# Patient Record
Sex: MALE | Race: WHITE | Employment: OTHER | URBAN - METROPOLITAN AREA
[De-identification: names, ages, dates, MRNs, and addresses within clinical notes are randomized per-mention and may not be internally consistent; named-entity substitution may affect disease eponyms.]

---

## 2017-01-09 RX ORDER — TRAMADOL HYDROCHLORIDE 50 MG/1
TABLET ORAL
Qty: 60 TABLET | Refills: 0 | OUTPATIENT
Start: 2017-01-09 | End: 2017-12-24 | Stop reason: SDUPTHER

## 2017-01-24 ENCOUNTER — NURSE ONLY (OUTPATIENT)
Dept: ORTHOPEDIC SURGERY | Age: 82
End: 2017-01-24

## 2017-01-24 DIAGNOSIS — M16.11 PRIMARY OSTEOARTHRITIS OF RIGHT HIP: ICD-10-CM

## 2017-01-24 DIAGNOSIS — M70.61 TROCHANTERIC BURSITIS, RIGHT HIP: ICD-10-CM

## 2017-01-24 DIAGNOSIS — M17.11 PRIMARY OSTEOARTHRITIS OF RIGHT KNEE: Primary | ICD-10-CM

## 2017-01-24 PROCEDURE — 20611 DRAIN/INJ JOINT/BURSA W/US: CPT | Performed by: PHYSICIAN ASSISTANT

## 2017-01-30 ENCOUNTER — TELEPHONE (OUTPATIENT)
Dept: ORTHOPEDIC SURGERY | Age: 82
End: 2017-01-30

## 2017-02-07 ENCOUNTER — HOSPITAL ENCOUNTER (OUTPATIENT)
Dept: SURGERY | Age: 82
Discharge: HOME | End: 2017-02-07
Attending: ORTHOPAEDIC SURGERY | Admitting: ORTHOPAEDIC SURGERY

## 2017-02-07 VITALS
RESPIRATION RATE: 14 BRPM | SYSTOLIC BLOOD PRESSURE: 165 MMHG | DIASTOLIC BLOOD PRESSURE: 66 MMHG | TEMPERATURE: 98.1 F | OXYGEN SATURATION: 96 % | HEART RATE: 60 BPM

## 2017-02-07 DIAGNOSIS — R52 PAIN: ICD-10-CM

## 2017-02-07 RX ORDER — SODIUM CHLORIDE 0.9 % (FLUSH) 0.9 %
10 SYRINGE (ML) INJECTION EVERY 12 HOURS SCHEDULED
Status: DISCONTINUED | OUTPATIENT
Start: 2017-02-07 | End: 2017-02-08 | Stop reason: HOSPADM

## 2017-02-07 RX ORDER — HYDROCODONE BITARTRATE AND ACETAMINOPHEN 5; 325 MG/1; MG/1
1 TABLET ORAL EVERY 6 HOURS PRN
Qty: 40 TABLET | Refills: 0 | Status: SHIPPED | OUTPATIENT
Start: 2017-02-07 | End: 2017-08-10

## 2017-02-07 RX ORDER — LIDOCAINE HYDROCHLORIDE 10 MG/ML
1 INJECTION, SOLUTION EPIDURAL; INFILTRATION; INTRACAUDAL; PERINEURAL
Status: ACTIVE | OUTPATIENT
Start: 2017-02-07 | End: 2017-02-07

## 2017-02-07 RX ORDER — SODIUM CHLORIDE, SODIUM LACTATE, POTASSIUM CHLORIDE, CALCIUM CHLORIDE 600; 310; 30; 20 MG/100ML; MG/100ML; MG/100ML; MG/100ML
INJECTION, SOLUTION INTRAVENOUS CONTINUOUS
Status: DISCONTINUED | OUTPATIENT
Start: 2017-02-07 | End: 2017-02-08 | Stop reason: HOSPADM

## 2017-02-07 RX ORDER — SODIUM CHLORIDE 0.9 % (FLUSH) 0.9 %
10 SYRINGE (ML) INJECTION PRN
Status: DISCONTINUED | OUTPATIENT
Start: 2017-02-07 | End: 2017-02-08 | Stop reason: HOSPADM

## 2017-02-07 RX ADMIN — SODIUM CHLORIDE, SODIUM LACTATE, POTASSIUM CHLORIDE, CALCIUM CHLORIDE: 600; 310; 30; 20 INJECTION, SOLUTION INTRAVENOUS at 10:42

## 2017-02-07 ASSESSMENT — PAIN SCALES - GENERAL
PAINLEVEL_OUTOF10: 0

## 2017-02-07 ASSESSMENT — PAIN DESCRIPTION - ORIENTATION: ORIENTATION: RIGHT

## 2017-02-07 ASSESSMENT — PAIN DESCRIPTION - LOCATION: LOCATION: HIP

## 2017-04-05 ENCOUNTER — OFFICE VISIT (OUTPATIENT)
Dept: ORTHOPEDIC SURGERY | Age: 82
End: 2017-04-05

## 2017-04-05 VITALS — WEIGHT: 149.91 LBS | HEIGHT: 64 IN | BODY MASS INDEX: 25.59 KG/M2

## 2017-04-05 DIAGNOSIS — M19.011 PRIMARY OSTEOARTHRITIS OF RIGHT SHOULDER: ICD-10-CM

## 2017-04-05 DIAGNOSIS — M25.511 PAIN OF RIGHT SHOULDER REGION: Primary | ICD-10-CM

## 2017-04-05 DIAGNOSIS — M25.512 PAIN OF LEFT SHOULDER REGION: ICD-10-CM

## 2017-04-05 DIAGNOSIS — M75.82 ROTATOR CUFF TENDINITIS, LEFT: ICD-10-CM

## 2017-04-05 PROCEDURE — 20611 DRAIN/INJ JOINT/BURSA W/US: CPT | Performed by: ORTHOPAEDIC SURGERY

## 2017-04-05 PROCEDURE — 73030 X-RAY EXAM OF SHOULDER: CPT | Performed by: ORTHOPAEDIC SURGERY

## 2017-04-05 PROCEDURE — 4040F PNEUMOC VAC/ADMIN/RCVD: CPT | Performed by: ORTHOPAEDIC SURGERY

## 2017-04-05 PROCEDURE — G8427 DOCREV CUR MEDS BY ELIG CLIN: HCPCS | Performed by: ORTHOPAEDIC SURGERY

## 2017-04-05 PROCEDURE — 1123F ACP DISCUSS/DSCN MKR DOCD: CPT | Performed by: ORTHOPAEDIC SURGERY

## 2017-04-05 PROCEDURE — 99213 OFFICE O/P EST LOW 20 MIN: CPT | Performed by: ORTHOPAEDIC SURGERY

## 2017-04-05 PROCEDURE — G8420 CALC BMI NORM PARAMETERS: HCPCS | Performed by: ORTHOPAEDIC SURGERY

## 2017-04-05 PROCEDURE — G8598 ASA/ANTIPLAT THER USED: HCPCS | Performed by: ORTHOPAEDIC SURGERY

## 2017-04-05 PROCEDURE — 1036F TOBACCO NON-USER: CPT | Performed by: ORTHOPAEDIC SURGERY

## 2017-08-04 ENCOUNTER — OFFICE VISIT (OUTPATIENT)
Dept: ORTHOPEDIC SURGERY | Age: 82
End: 2017-08-04

## 2017-08-04 DIAGNOSIS — M16.11 PRIMARY OSTEOARTHRITIS OF RIGHT HIP: ICD-10-CM

## 2017-08-04 DIAGNOSIS — M19.011 PRIMARY OSTEOARTHRITIS OF BOTH SHOULDERS: ICD-10-CM

## 2017-08-04 DIAGNOSIS — M19.012 PRIMARY OSTEOARTHRITIS OF BOTH SHOULDERS: ICD-10-CM

## 2017-08-04 DIAGNOSIS — M17.11 PRIMARY OSTEOARTHRITIS OF RIGHT KNEE: Primary | ICD-10-CM

## 2017-08-04 PROCEDURE — 20610 DRAIN/INJ JOINT/BURSA W/O US: CPT | Performed by: PHYSICIAN ASSISTANT

## 2017-08-04 PROCEDURE — 1036F TOBACCO NON-USER: CPT | Performed by: PHYSICIAN ASSISTANT

## 2017-08-04 PROCEDURE — 99999 PR OFFICE/OUTPT VISIT,PROCEDURE ONLY: CPT | Performed by: PHYSICIAN ASSISTANT

## 2017-08-15 ENCOUNTER — HOSPITAL ENCOUNTER (OUTPATIENT)
Dept: SURGERY | Age: 82
Discharge: HOME | End: 2017-08-15
Attending: ORTHOPAEDIC SURGERY | Admitting: ORTHOPAEDIC SURGERY

## 2017-08-15 VITALS
SYSTOLIC BLOOD PRESSURE: 125 MMHG | RESPIRATION RATE: 18 BRPM | HEIGHT: 64 IN | BODY MASS INDEX: 23.56 KG/M2 | TEMPERATURE: 97 F | WEIGHT: 138 LBS | HEART RATE: 60 BPM | DIASTOLIC BLOOD PRESSURE: 58 MMHG | OXYGEN SATURATION: 98 %

## 2017-08-15 DIAGNOSIS — R52 PAIN: ICD-10-CM

## 2017-08-15 RX ORDER — PROMETHAZINE HYDROCHLORIDE 25 MG/ML
6.25 INJECTION, SOLUTION INTRAMUSCULAR; INTRAVENOUS
Status: ACTIVE | OUTPATIENT
Start: 2017-08-15 | End: 2017-08-15

## 2017-08-15 RX ORDER — OXYCODONE HYDROCHLORIDE AND ACETAMINOPHEN 5; 325 MG/1; MG/1
2 TABLET ORAL PRN
Status: ACTIVE | OUTPATIENT
Start: 2017-08-15 | End: 2017-08-15

## 2017-08-15 RX ORDER — MORPHINE SULFATE 2 MG/ML
1 INJECTION, SOLUTION INTRAMUSCULAR; INTRAVENOUS EVERY 5 MIN PRN
Status: DISCONTINUED | OUTPATIENT
Start: 2017-08-15 | End: 2017-08-16 | Stop reason: HOSPADM

## 2017-08-15 RX ORDER — ONDANSETRON 2 MG/ML
4 INJECTION INTRAMUSCULAR; INTRAVENOUS
Status: ACTIVE | OUTPATIENT
Start: 2017-08-15 | End: 2017-08-15

## 2017-08-15 RX ORDER — DIPHENHYDRAMINE HYDROCHLORIDE 50 MG/ML
12.5 INJECTION INTRAMUSCULAR; INTRAVENOUS
Status: ACTIVE | OUTPATIENT
Start: 2017-08-15 | End: 2017-08-15

## 2017-08-15 RX ORDER — LABETALOL HYDROCHLORIDE 5 MG/ML
5 INJECTION, SOLUTION INTRAVENOUS EVERY 10 MIN PRN
Status: DISCONTINUED | OUTPATIENT
Start: 2017-08-15 | End: 2017-08-16 | Stop reason: HOSPADM

## 2017-08-15 RX ORDER — SODIUM CHLORIDE, SODIUM LACTATE, POTASSIUM CHLORIDE, CALCIUM CHLORIDE 600; 310; 30; 20 MG/100ML; MG/100ML; MG/100ML; MG/100ML
INJECTION, SOLUTION INTRAVENOUS CONTINUOUS
Status: DISCONTINUED | OUTPATIENT
Start: 2017-08-15 | End: 2017-08-16 | Stop reason: HOSPADM

## 2017-08-15 RX ORDER — OXYCODONE HYDROCHLORIDE AND ACETAMINOPHEN 5; 325 MG/1; MG/1
1 TABLET ORAL PRN
Status: ACTIVE | OUTPATIENT
Start: 2017-08-15 | End: 2017-08-15

## 2017-08-15 RX ORDER — HYDRALAZINE HYDROCHLORIDE 20 MG/ML
5 INJECTION INTRAMUSCULAR; INTRAVENOUS EVERY 10 MIN PRN
Status: DISCONTINUED | OUTPATIENT
Start: 2017-08-15 | End: 2017-08-16 | Stop reason: HOSPADM

## 2017-08-15 RX ORDER — MORPHINE SULFATE 2 MG/ML
2 INJECTION, SOLUTION INTRAMUSCULAR; INTRAVENOUS EVERY 5 MIN PRN
Status: DISCONTINUED | OUTPATIENT
Start: 2017-08-15 | End: 2017-08-16 | Stop reason: HOSPADM

## 2017-08-15 RX ORDER — MEPERIDINE HYDROCHLORIDE 50 MG/ML
12.5 INJECTION INTRAMUSCULAR; INTRAVENOUS; SUBCUTANEOUS EVERY 5 MIN PRN
Status: DISCONTINUED | OUTPATIENT
Start: 2017-08-15 | End: 2017-08-16 | Stop reason: HOSPADM

## 2017-08-15 RX ADMIN — SODIUM CHLORIDE, SODIUM LACTATE, POTASSIUM CHLORIDE, CALCIUM CHLORIDE: 600; 310; 30; 20 INJECTION, SOLUTION INTRAVENOUS at 07:55

## 2017-08-15 ASSESSMENT — PAIN SCALES - GENERAL
PAINLEVEL_OUTOF10: 0

## 2017-08-15 ASSESSMENT — PAIN - FUNCTIONAL ASSESSMENT: PAIN_FUNCTIONAL_ASSESSMENT: 0-10

## 2017-08-21 ENCOUNTER — TELEPHONE (OUTPATIENT)
Dept: CARDIOLOGY CLINIC | Age: 82
End: 2017-08-21

## 2017-08-21 ENCOUNTER — PROCEDURE VISIT (OUTPATIENT)
Dept: CARDIOLOGY CLINIC | Age: 82
End: 2017-08-21

## 2017-08-21 DIAGNOSIS — R00.1 BRADYCARDIA: ICD-10-CM

## 2017-08-21 DIAGNOSIS — I47.29 NSVT (NONSUSTAINED VENTRICULAR TACHYCARDIA): Primary | ICD-10-CM

## 2017-08-21 DIAGNOSIS — Z95.0 CARDIAC PACEMAKER IN SITU: Primary | ICD-10-CM

## 2017-08-21 PROCEDURE — 93280 PM DEVICE PROGR EVAL DUAL: CPT | Performed by: INTERNAL MEDICINE

## 2017-08-22 ENCOUNTER — OFFICE VISIT (OUTPATIENT)
Dept: CARDIOLOGY CLINIC | Age: 82
End: 2017-08-22

## 2017-08-22 VITALS
BODY MASS INDEX: 23.39 KG/M2 | WEIGHT: 137 LBS | HEIGHT: 64 IN | DIASTOLIC BLOOD PRESSURE: 64 MMHG | HEART RATE: 60 BPM | SYSTOLIC BLOOD PRESSURE: 136 MMHG

## 2017-08-22 DIAGNOSIS — I47.1 PAT (PAROXYSMAL ATRIAL TACHYCARDIA) (HCC): Primary | ICD-10-CM

## 2017-08-22 DIAGNOSIS — I49.5 SINUS NODE DYSFUNCTION (HCC): ICD-10-CM

## 2017-08-22 DIAGNOSIS — I49.1 PREMATURE ATRIAL CONTRACTION: ICD-10-CM

## 2017-08-22 DIAGNOSIS — I47.29 NSVT (NONSUSTAINED VENTRICULAR TACHYCARDIA): ICD-10-CM

## 2017-08-22 PROCEDURE — 99214 OFFICE O/P EST MOD 30 MIN: CPT | Performed by: NURSE PRACTITIONER

## 2017-08-22 PROCEDURE — 93000 ELECTROCARDIOGRAM COMPLETE: CPT | Performed by: NURSE PRACTITIONER

## 2017-08-22 PROCEDURE — 1123F ACP DISCUSS/DSCN MKR DOCD: CPT | Performed by: NURSE PRACTITIONER

## 2017-08-22 PROCEDURE — G8427 DOCREV CUR MEDS BY ELIG CLIN: HCPCS | Performed by: NURSE PRACTITIONER

## 2017-08-22 PROCEDURE — G8420 CALC BMI NORM PARAMETERS: HCPCS | Performed by: NURSE PRACTITIONER

## 2017-08-22 PROCEDURE — 1036F TOBACCO NON-USER: CPT | Performed by: NURSE PRACTITIONER

## 2017-08-22 PROCEDURE — 4040F PNEUMOC VAC/ADMIN/RCVD: CPT | Performed by: NURSE PRACTITIONER

## 2017-08-22 PROCEDURE — G8598 ASA/ANTIPLAT THER USED: HCPCS | Performed by: NURSE PRACTITIONER

## 2017-08-22 RX ORDER — DILTIAZEM HYDROCHLORIDE 120 MG/1
CAPSULE, EXTENDED RELEASE ORAL
Qty: 90 CAPSULE | Refills: 3 | Status: SHIPPED | OUTPATIENT
Start: 2017-08-22 | End: 2017-09-18 | Stop reason: SDUPTHER

## 2017-08-22 RX ORDER — DILTIAZEM HYDROCHLORIDE 120 MG/1
120 CAPSULE, COATED, EXTENDED RELEASE ORAL DAILY
Qty: 30 CAPSULE | Refills: 11 | Status: SHIPPED | OUTPATIENT
Start: 2017-08-22 | End: 2017-08-22 | Stop reason: SDUPTHER

## 2017-08-25 PROBLEM — I49.1 PREMATURE ATRIAL CONTRACTION: Status: ACTIVE | Noted: 2017-08-25

## 2017-08-25 PROBLEM — I49.5 SINUS NODE DYSFUNCTION (HCC): Status: ACTIVE | Noted: 2017-08-25

## 2017-08-25 PROBLEM — I47.29 NSVT (NONSUSTAINED VENTRICULAR TACHYCARDIA): Status: ACTIVE | Noted: 2017-08-25

## 2017-08-25 PROBLEM — I47.1 PAT (PAROXYSMAL ATRIAL TACHYCARDIA) (HCC): Status: ACTIVE | Noted: 2017-08-25

## 2017-09-12 ENCOUNTER — TELEPHONE (OUTPATIENT)
Dept: CARDIOLOGY CLINIC | Age: 82
End: 2017-09-12

## 2017-09-14 ENCOUNTER — OFFICE VISIT (OUTPATIENT)
Dept: ORTHOPEDIC SURGERY | Age: 82
End: 2017-09-14

## 2017-09-14 VITALS
WEIGHT: 136.91 LBS | HEIGHT: 64 IN | BODY MASS INDEX: 23.37 KG/M2 | SYSTOLIC BLOOD PRESSURE: 118 MMHG | DIASTOLIC BLOOD PRESSURE: 64 MMHG | HEART RATE: 63 BPM

## 2017-09-14 DIAGNOSIS — M25.511 PAIN OF RIGHT SHOULDER REGION: ICD-10-CM

## 2017-09-14 DIAGNOSIS — M75.111 INCOMPLETE TEAR OF RIGHT ROTATOR CUFF: ICD-10-CM

## 2017-09-14 DIAGNOSIS — M70.62 TROCHANTERIC BURSITIS OF BOTH HIPS: ICD-10-CM

## 2017-09-14 DIAGNOSIS — M25.552 LEFT HIP PAIN: Primary | ICD-10-CM

## 2017-09-14 DIAGNOSIS — M70.61 TROCHANTERIC BURSITIS OF BOTH HIPS: ICD-10-CM

## 2017-09-14 PROCEDURE — 4040F PNEUMOC VAC/ADMIN/RCVD: CPT | Performed by: PHYSICIAN ASSISTANT

## 2017-09-14 PROCEDURE — G8427 DOCREV CUR MEDS BY ELIG CLIN: HCPCS | Performed by: PHYSICIAN ASSISTANT

## 2017-09-14 PROCEDURE — G8598 ASA/ANTIPLAT THER USED: HCPCS | Performed by: PHYSICIAN ASSISTANT

## 2017-09-14 PROCEDURE — G8420 CALC BMI NORM PARAMETERS: HCPCS | Performed by: PHYSICIAN ASSISTANT

## 2017-09-14 PROCEDURE — 73030 X-RAY EXAM OF SHOULDER: CPT | Performed by: PHYSICIAN ASSISTANT

## 2017-09-14 PROCEDURE — 73502 X-RAY EXAM HIP UNI 2-3 VIEWS: CPT | Performed by: PHYSICIAN ASSISTANT

## 2017-09-14 PROCEDURE — 1123F ACP DISCUSS/DSCN MKR DOCD: CPT | Performed by: PHYSICIAN ASSISTANT

## 2017-09-14 PROCEDURE — 99214 OFFICE O/P EST MOD 30 MIN: CPT | Performed by: PHYSICIAN ASSISTANT

## 2017-09-14 PROCEDURE — 1036F TOBACCO NON-USER: CPT | Performed by: PHYSICIAN ASSISTANT

## 2017-09-14 RX ORDER — METHYLPREDNISOLONE 4 MG/1
TABLET ORAL
Qty: 1 KIT | Refills: 0 | Status: SHIPPED | OUTPATIENT
Start: 2017-09-14 | End: 2017-09-20

## 2017-09-15 ENCOUNTER — TELEPHONE (OUTPATIENT)
Dept: CARDIOLOGY CLINIC | Age: 82
End: 2017-09-15

## 2017-09-15 ENCOUNTER — TELEPHONE (OUTPATIENT)
Dept: CARDIOLOGY | Age: 82
End: 2017-09-15

## 2017-09-18 RX ORDER — DILTIAZEM HYDROCHLORIDE 180 MG/1
180 CAPSULE, COATED, EXTENDED RELEASE ORAL DAILY
Qty: 90 CAPSULE | Refills: 0 | Status: SHIPPED | OUTPATIENT
Start: 2017-09-18 | End: 2017-10-13 | Stop reason: SDUPTHER

## 2017-09-19 ENCOUNTER — TELEPHONE (OUTPATIENT)
Dept: CARDIOLOGY CLINIC | Age: 82
End: 2017-09-19

## 2017-09-27 RX ORDER — METHYLPREDNISOLONE 4 MG/1
TABLET ORAL
Qty: 1 KIT | Refills: 0 | Status: SHIPPED | OUTPATIENT
Start: 2017-09-27 | End: 2017-10-03

## 2017-10-05 ENCOUNTER — OFFICE VISIT (OUTPATIENT)
Dept: ORTHOPEDIC SURGERY | Age: 82
End: 2017-10-05

## 2017-10-05 VITALS
DIASTOLIC BLOOD PRESSURE: 66 MMHG | BODY MASS INDEX: 24.8 KG/M2 | SYSTOLIC BLOOD PRESSURE: 126 MMHG | HEART RATE: 68 BPM | WEIGHT: 139.99 LBS | HEIGHT: 63 IN

## 2017-10-05 DIAGNOSIS — S46.212A BICEPS RUPTURE, DISTAL, LEFT, INITIAL ENCOUNTER: Primary | ICD-10-CM

## 2017-10-05 DIAGNOSIS — M75.82 ROTATOR CUFF TENDINITIS, LEFT: ICD-10-CM

## 2017-10-05 PROCEDURE — G8598 ASA/ANTIPLAT THER USED: HCPCS | Performed by: ORTHOPAEDIC SURGERY

## 2017-10-05 PROCEDURE — 1123F ACP DISCUSS/DSCN MKR DOCD: CPT | Performed by: ORTHOPAEDIC SURGERY

## 2017-10-05 PROCEDURE — G8420 CALC BMI NORM PARAMETERS: HCPCS | Performed by: ORTHOPAEDIC SURGERY

## 2017-10-05 PROCEDURE — 99213 OFFICE O/P EST LOW 20 MIN: CPT | Performed by: ORTHOPAEDIC SURGERY

## 2017-10-05 PROCEDURE — 1036F TOBACCO NON-USER: CPT | Performed by: ORTHOPAEDIC SURGERY

## 2017-10-05 PROCEDURE — 4040F PNEUMOC VAC/ADMIN/RCVD: CPT | Performed by: ORTHOPAEDIC SURGERY

## 2017-10-05 PROCEDURE — G8427 DOCREV CUR MEDS BY ELIG CLIN: HCPCS | Performed by: ORTHOPAEDIC SURGERY

## 2017-10-05 PROCEDURE — G8484 FLU IMMUNIZE NO ADMIN: HCPCS | Performed by: ORTHOPAEDIC SURGERY

## 2017-10-05 RX ORDER — TRAMADOL HYDROCHLORIDE 50 MG/1
50 TABLET ORAL EVERY 6 HOURS PRN
Qty: 28 TABLET | Refills: 0 | Status: SHIPPED | OUTPATIENT
Start: 2017-10-05 | End: 2017-10-15

## 2017-10-05 NOTE — PROGRESS NOTES
CHIEF COMPLAINT:    Chief Complaint   Patient presents with    Shoulder Pain     LEFT SHOULDER- seen in ER 1 day ago. Opening box and felt pop in shoulder. HISTORY OF PRESENT ILLNESS:                The patient is a 80 y.o. male he presents today for orthopedic evaluation of his LEFT upper arm. He was lifting up laid on an Ensure case when he felt a pop in his biceps. He had difficulty moving his arm and presented to the emergency room last night. He has known rotator cuff pathology on the RIGHT shoulder. He is left-hand dominant. He denies numbness or tingling.   Past Medical History:   Diagnosis Date    Arthritis     BPH (benign prostatic hypertrophy)     Cancer (HCC)     prostate    Dementia     Frequency of urination     Glaucoma     Hypertension     Incontinence     Unspecified cerebral artery occlusion with cerebral infarction     TIA in 2011          The pain assessment was noted & is as follows:  Pain Assessment  Location of Pain: Shoulder  Location Modifiers: Left  Severity of Pain: 7  Quality of Pain: Popping, Aching, Sharp  Duration of Pain: Persistent  Frequency of Pain: Intermittent  Aggravating Factors: Bending, Straightening  Limiting Behavior: Yes  Relieving Factors: Rest  Result of Injury: Yes]      Work Status/Functionality:     Past Medical History: Medical history form was reviewed today & can be found in the media tab  Past Medical History:   Diagnosis Date    Arthritis     BPH (benign prostatic hypertrophy)     Cancer (HCC)     prostate    Dementia     Frequency of urination     Glaucoma     Hypertension     Incontinence     Unspecified cerebral artery occlusion with cerebral infarction     TIA in 2011      Past Surgical History:     Past Surgical History:   Procedure Laterality Date    CATARACT REMOVAL WITH IMPLANT Bilateral     HIP SURGERY Right 09/06/2016    arthrogram and cortisone injection    KIDNEY TRANSPLANT      OTHER SURGICAL HISTORY  3/21/2013 chart.  CONSTITUTIONAL: Denies unexplained weight loss, fevers, chills   NEUROLOGICAL: Denies unsteady gait or progressive weakness  SKIN: Denies skin changes, delayed healing, rash, itching       PHYSICAL EXAM:    Vitals: Blood pressure 126/66, pulse 68, height 5' 2.99\" (1.6 m), weight 139 lb 15.9 oz (63.5 kg). GENERAL EXAM:  · General Apparence: Patient is adequately groomed with no evidence of malnutrition. · Orientation: The patient is oriented to time, place and person. · Mood & Affect:The patient's mood and affect are appropriate       LEFT upper extremity PHYSICAL EXAMINATION:  · Inspection:  No significant deformity of the  shoulder, no ecchymosis or erythema at this time    · Palpation:  He is particularly tender through the mid muscle belly of the LEFT bicep extending distally to the distal bicep tendon. He does not have any significant tenderness of the acromion or proximal humerus    · Range of Motion: Forward flexion of the shoulder to 65°, abduction of 45°. Gentle range of motion of the elbow is tolerated in flexion but there is discomfort with full extension. · Strength: Difficult to fully assess the rotator cuff but he does have significant weakness with biceps tendon distal bicep testing. He has gross weakness with resisted supination. He also has moderate to severe weakness with supraspinatus and infraspinous testing    · Special Tests:  He can feel touch that the LEFT upper extremity            · Skin:  There are no rashes, ulcerations or lesions. · Gait & station: He ambulates with a slightly antalgic gait using a walker      · Additional Examinations:              Diagnostic Testing:     I reviewed x-rays from the emergency room. He does have a high riding humeral head indicative of rotator cuff pathology. He also has evidence of a.c. joint and glenohumeral arthritis    Orders   No orders of the defined types were placed in this encounter.         Assessment / Treatment Plan: 1.  LEFT distal biceps rupture, possible rotator cuff involvement as well    The patient is a pacemaker and cannot have an MRI scan. In addition, he is not interested in any aggressive intervention. At this time I recommended conservative treatment with sling utilization as needed for pain. He is getting use his walker for weightbearing as tolerated on his LEFT shoulder. We will plan to see him back in 2-3 weeks for follow-up. I will give him a prescription for Ultram.      I have personally performed and/or participated in the history, exam and medical decision making and agree with all pertinent clinical information. I have also reviewed and agree with the past medical, family and social history unless otherwise noted. This dictation was performed with a verbal recognition program (DRAGON) and it was checked for errors. It is possible that there are still dictated errors within this office note. If so, please bring any errors to my attention for an addendum. All efforts were made to ensure that this office note is accurate.           Mckinley Singh MD

## 2017-10-05 NOTE — MR AVS SNAPSHOT
After Visit Summary             Addi Nearing   10/5/2017 2:15 PM   Office Visit    Description:  Male : 1924   Provider:  Marilee Moss MD   Department:  [facility]              Your Follow-Up and Future Appointments         Below is a list of your follow-up and future appointments. This may not be a complete list as you may have made appointments directly with providers that we are not aware of or your providers may have made some for you. Please call your providers to confirm appointments. It is important to keep your appointments. Please bring your current insurance card, photo ID, co-pay, and all medication bottles to your appointment. If self-pay, payment is expected at the time of service. Your To-Do List     Future Appointments Provider Department Dept Phone    10/13/2017 10:45 AM Karla Perdomo [facility] [place] [phone]    10/13/2017 10:45 AM Jacklyn Eddy MD [facility] [place] [phone]    Please arrive 15 minutes prior to appointment, bring photo ID and insurance card. Follow-Up    Return in about 3 weeks (around 10/26/2017). Information from Your Visit        Department     Name Address Phone Fax    [facility] [address] [address] [phone]      You Were Seen for:         Comments    Biceps rupture, distal, left, initial encounter   [[id number]]         Vital Signs     Blood Pressure Pulse Height Weight Body Mass Index Smoking Status    126/66 68 5' 2.99\" (1.6 m) 139 lb 15.9 oz (63.5 kg) 24.8 kg/m2 Former Smoker      Instructions    Patient was provided with instructions regarding their diagnosis and treatment today. They voiced understanding of the treatment plan and were instructed on when to return to the clinic.             Today's Medication Changes          These changes are accurate as of: 10/5/17  2:51 PM.  If you have any memantine (NAMENDA XR) 28 MG CP24 Take 28 mg by mouth daily. Calcium Carbonate-Vitamin D (CALCIUM + D) 600-200 MG-UNIT TABS Take 1 tablet by mouth daily. Rivastigmine (EXELON) 13.3 MG/24HR PT24 Place 1 patch onto the skin daily. Allergies              Pcn [Penicillins]          Additional Information        Basic Information     Date Of Birth Sex Race Ethnicity Preferred Language    6/17/1924 Male White Non-/Non  English      Problem List as of 10/5/2017  Date Reviewed: 10/5/2017                BPH (benign prostatic hypertrophy) with urinary obstruction (Chronic)    Prostate cancer (HCC) (Chronic)    Microhematuria (Chronic)    PAT (paroxysmal atrial tachycardia) (HCC)    Premature atrial contraction    NSVT (nonsustained ventricular tachycardia) (HCC)    Sinus node dysfunction (HCC)    Primary osteoarthritis of both shoulders    Primary osteoarthritis of right hip    Primary osteoarthritis of right knee    Trochanteric bursitis, right hip    Primary osteoarthritis of right shoulder    Lumbar radiculitis    Lower urinary tract infectious disease    Hallucinations    Sciatica    Hip osteoarthritis    Lower extremity weakness    Cardiac pacemaker in situ    Bradycardia    Hyperglycemia    Carotid bruit      Immunizations as of 10/5/2017     Name Date    Influenza Virus Vaccine 11/26/2014      Preventive Care        Date Due    Tetanus Combination Vaccine (1 - Tdap) 6/17/1943    Zoster Vaccine 6/17/1984    Pneumococcal Vaccines (two) for age 72 years & over with: cerebrospinal fluid leaks, cochlear implants, hemoglobinopathies,  asplenia, immunodeficiencies, HIV infection, or chronic renal failure (2 of 2 - PCV13) 11/3/2016            [facility] Signup           [employer] allows you to send messages to your doctor, view your test results, renew your prescriptions, schedule appointments, view visit notes, and more. How Do I Sign Up? 1.  In your Internet browser, go to

## 2017-10-13 ENCOUNTER — OFFICE VISIT (OUTPATIENT)
Dept: CARDIOLOGY CLINIC | Age: 82
End: 2017-10-13

## 2017-10-13 ENCOUNTER — PROCEDURE VISIT (OUTPATIENT)
Dept: CARDIOLOGY CLINIC | Age: 82
End: 2017-10-13

## 2017-10-13 VITALS
SYSTOLIC BLOOD PRESSURE: 118 MMHG | WEIGHT: 145 LBS | DIASTOLIC BLOOD PRESSURE: 56 MMHG | HEIGHT: 62 IN | BODY MASS INDEX: 26.68 KG/M2

## 2017-10-13 DIAGNOSIS — I47.29 NSVT (NONSUSTAINED VENTRICULAR TACHYCARDIA): ICD-10-CM

## 2017-10-13 DIAGNOSIS — R00.1 BRADYCARDIA: ICD-10-CM

## 2017-10-13 DIAGNOSIS — I47.1 PAT (PAROXYSMAL ATRIAL TACHYCARDIA) (HCC): ICD-10-CM

## 2017-10-13 DIAGNOSIS — Z95.0 CARDIAC PACEMAKER IN SITU: Primary | ICD-10-CM

## 2017-10-13 DIAGNOSIS — I49.5 SINUS NODE DYSFUNCTION (HCC): Primary | ICD-10-CM

## 2017-10-13 PROCEDURE — 1036F TOBACCO NON-USER: CPT | Performed by: INTERNAL MEDICINE

## 2017-10-13 PROCEDURE — G8417 CALC BMI ABV UP PARAM F/U: HCPCS | Performed by: INTERNAL MEDICINE

## 2017-10-13 PROCEDURE — G8484 FLU IMMUNIZE NO ADMIN: HCPCS | Performed by: INTERNAL MEDICINE

## 2017-10-13 PROCEDURE — 1123F ACP DISCUSS/DSCN MKR DOCD: CPT | Performed by: INTERNAL MEDICINE

## 2017-10-13 PROCEDURE — 99214 OFFICE O/P EST MOD 30 MIN: CPT | Performed by: INTERNAL MEDICINE

## 2017-10-13 PROCEDURE — G8598 ASA/ANTIPLAT THER USED: HCPCS | Performed by: INTERNAL MEDICINE

## 2017-10-13 PROCEDURE — G8427 DOCREV CUR MEDS BY ELIG CLIN: HCPCS | Performed by: INTERNAL MEDICINE

## 2017-10-13 PROCEDURE — 4040F PNEUMOC VAC/ADMIN/RCVD: CPT | Performed by: INTERNAL MEDICINE

## 2017-10-13 PROCEDURE — 93280 PM DEVICE PROGR EVAL DUAL: CPT | Performed by: INTERNAL MEDICINE

## 2017-10-13 RX ORDER — DILTIAZEM HYDROCHLORIDE 180 MG/1
180 CAPSULE, COATED, EXTENDED RELEASE ORAL DAILY
Qty: 90 CAPSULE | Refills: 3 | Status: SHIPPED | OUTPATIENT
Start: 2017-10-13 | End: 2018-05-14 | Stop reason: SDUPTHER

## 2017-10-13 NOTE — LETTER
Prior to Admission medications    Medication Sig Start Date End Date Taking? Authorizing Provider   HYDROcodone-acetaminophen (NORCO) 5-325 MG per tablet Take 1 tablet by mouth every 8 hours as needed for Pain . 10/5/17  Yes SONIA Robert   traMADol Gilda Mitten) 50 MG tablet Take 1 tablet by mouth every 6 hours as needed for Pain 10/5/17 10/15/17 Yes Kasie Morales MD   diltiazem (CARDIZEM CD) 180 MG extended release capsule Take 1 capsule by mouth daily  Patient taking differently: Take 120 mg by mouth daily  9/18/17 12/17/17 Yes Lynn Dominguez CNP   diclofenac sodium (VOLTAREN) 1 % GEL APPLY 4 GRAM TO RIGHT KNEE THREE TIMES DAILY AS NEEDED FOR PAIN 5/24/17  Yes Kasie Morales MD   traMADol (ULTRAM) 50 MG tablet TAKE 1 TABLET BY MOUTH EVERY 12 HOURS AS NEEDED FOR PAIN 1/9/17  Yes Kasie Morales MD   LORazepam (ATIVAN) 1 MG tablet Take 1 mg by mouth nightly  5/16/16  Yes Historical Provider, MD   timolol (TIMOPTIC) 0.5 % ophthalmic solution Place 1 drop into both eyes daily  4/11/16  Yes Historical Provider, MD   latanoprost (XALATAN) 0.005 % ophthalmic solution Place 1 drop into both eyes nightly. Yes Historical Provider, MD   memantine (NAMENDA XR) 28 MG CP24 Take 28 mg by mouth daily. Yes Historical Provider, MD   Calcium Carbonate-Vitamin D (CALCIUM + D) 600-200 MG-UNIT TABS Take 1 tablet by mouth daily. Yes Historical Provider, MD   Rivastigmine (EXELON) 13.3 MG/24HR PT24 Place 1 patch onto the skin daily. Yes Historical Provider, MD            REVIEW OF SYSTEMS:    · Constitutional: there has been no unanticipated weight loss. There's been no change in energy level, sleep pattern, or activity level. · Eyes: No visual changes or diplopia. No scleral icterus. · ENT: No Headaches, hearing loss or vertigo. No mouth sores or sore throat. · Cardiovascular: No for chest pain, No for dyspnea on exertion, No for palpitations or No for loss of consciousness.  No cough, hemoptysis, No for pleuritic pain, or phlebitis. · Respiratory: No for cough or wheezing, no sputum production. No hematemesis. · Gastrointestinal: No abdominal pain, appetite loss, blood in stools. No change in bowel or bladder habits. · Genitourinary: No dysuria, trouble voiding, or hematuria. · Musculoskeletal:  No gait disturbance, No for weakness or joint complaints. · Integumentary: No rash or pruritis. · Neurological: No headache, diplopia, change in muscle strength, numbness or tingling. No change in gait, balance, coordination, mood, affect, memory, mentation, behavior. · Psychiatric: No anxiety, or depression. · Endocrine: No temperature intolerance. No excessive thirst, fluid intake, or urination. No tremor. · Hematologic/Lymphatic: No abnormal bruising or bleeding, blood clots or swollen lymph nodes. · Allergic/Immunologic: No nasal congestion or hives. Physical Examination:    BP (!) 118/56   Ht 5' 2\" (1.575 m)   Wt 145 lb (65.8 kg)   BMI 26.52 kg/m²     Constitutional and General Appearance: alert, cooperative, no distress and appears stated age  [de-identified]: PERRL, no cervical lymphadenopathy. No masses palpable. Normal oral mucosa  Respiratory:  · Normal excursion and expansion without use of accessory muscles  · Resp Auscultation: Normal breath sounds without dullness or wheezing  Cardiovascular:  · The apical impulse is not displaced  · Heart tones are crisp and normal. irregular S1 and S2.  · Jugular venous pulsation Normal  · The carotid upstroke is normal in amplitude and contour without delay or bruit  · Peripheral pulses are symmetrical and full  Abdomen:  · No masses or tenderness  · Bowel sounds present  Extremities:  ·  No Cyanosis or Clubbing  ·  Lower extremity edema: Yes trace bilaterally  · Skin: Warm and dry  Neurological:  · Alert and oriented.   · Moves all extremities well  · No abnormalities of mood, affect, memory, mentation, or behavior are noted      DATA:  Reviewed  ECG:    ECHO:

## 2017-10-13 NOTE — PROGRESS NOTES
Yes Gregorio Martinez CNP   diclofenac sodium (VOLTAREN) 1 % GEL APPLY 4 GRAM TO RIGHT KNEE THREE TIMES DAILY AS NEEDED FOR PAIN 5/24/17  Yes Mahamed Bangura MD   traMADol (ULTRAM) 50 MG tablet TAKE 1 TABLET BY MOUTH EVERY 12 HOURS AS NEEDED FOR PAIN 1/9/17  Yes Mahamed Bangura MD   LORazepam (ATIVAN) 1 MG tablet Take 1 mg by mouth nightly  5/16/16  Yes Historical Provider, MD   timolol (TIMOPTIC) 0.5 % ophthalmic solution Place 1 drop into both eyes daily  4/11/16  Yes Historical Provider, MD   latanoprost (XALATAN) 0.005 % ophthalmic solution Place 1 drop into both eyes nightly. Yes Historical Provider, MD   memantine (NAMENDA XR) 28 MG CP24 Take 28 mg by mouth daily. Yes Historical Provider, MD   Calcium Carbonate-Vitamin D (CALCIUM + D) 600-200 MG-UNIT TABS Take 1 tablet by mouth daily. Yes Historical Provider, MD   Rivastigmine (EXELON) 13.3 MG/24HR PT24 Place 1 patch onto the skin daily. Yes Historical Provider, MD            REVIEW OF SYSTEMS:    · Constitutional: there has been no unanticipated weight loss. There's been no change in energy level, sleep pattern, or activity level. · Eyes: No visual changes or diplopia. No scleral icterus. · ENT: No Headaches, hearing loss or vertigo. No mouth sores or sore throat. · Cardiovascular: No for chest pain, No for dyspnea on exertion, No for palpitations or No for loss of consciousness. No cough, hemoptysis, No for pleuritic pain, or phlebitis. · Respiratory: No for cough or wheezing, no sputum production. No hematemesis. · Gastrointestinal: No abdominal pain, appetite loss, blood in stools. No change in bowel or bladder habits. · Genitourinary: No dysuria, trouble voiding, or hematuria. · Musculoskeletal:  No gait disturbance, No for weakness or joint complaints. · Integumentary: No rash or pruritis. · Neurological: No headache, diplopia, change in muscle strength, numbness or tingling.  No change in gait, balance, coordination, mood, affect, memory, mentation, behavior. · Psychiatric: No anxiety, or depression. · Endocrine: No temperature intolerance. No excessive thirst, fluid intake, or urination. No tremor. · Hematologic/Lymphatic: No abnormal bruising or bleeding, blood clots or swollen lymph nodes. · Allergic/Immunologic: No nasal congestion or hives. Physical Examination:    BP (!) 118/56   Ht 5' 2\" (1.575 m)   Wt 145 lb (65.8 kg)   BMI 26.52 kg/m²    Constitutional and General Appearance: alert, cooperative, no distress and appears stated age  [de-identified]: PERRL, no cervical lymphadenopathy. No masses palpable. Normal oral mucosa  Respiratory:  · Normal excursion and expansion without use of accessory muscles  · Resp Auscultation: Normal breath sounds without dullness or wheezing  Cardiovascular:  · The apical impulse is not displaced  · Heart tones are crisp and normal. irregular S1 and S2.  · Jugular venous pulsation Normal  · The carotid upstroke is normal in amplitude and contour without delay or bruit  · Peripheral pulses are symmetrical and full  Abdomen:  · No masses or tenderness  · Bowel sounds present  Extremities:  ·  No Cyanosis or Clubbing  ·  Lower extremity edema: Yes trace bilaterally  · Skin: Warm and dry  Neurological:  · Alert and oriented. · Moves all extremities well  · No abnormalities of mood, affect, memory, mentation, or behavior are noted      DATA:  Reviewed  ECG:    ECHO:       IMPRESSION:    Sinus node dysfunction s/p pacemaker   Atrial tachycardia  Hypertension      RECOMMENDATIONS:  1. Increase Cardizem to 180 mg daily. 2. Follow-up with Lynn Dominguez CNP in 9 months. QUALITY MEASURES  1. Tobacco Cessation Counseling: NA  2. Retake of BP if >140/90:   NA  3. Documentation to PCP/referring for new patient:  Sent to PCP at close of office visit  4. CAD patient on anti-platelet: NA  5. CAD patient on STATIN therapy:  NA  6.  Patient with CHF and aFib on anticoagulation:  NA       All questions and concerns were addressed to the patient/family. Alternatives to my treatment were discussed. JAK Jay F.A.C.C. [facility]. [address].   [place] [address]  Phone: [phone]  Fax: [phone]   10/13/2017

## 2017-10-28 NOTE — COMMUNICATION BODY
[facility]   Electrophysiology Note      Reason for office visit: atrial tachycardia . HISTORY OF PRESENT ILLNESS: Rahat Damon is a 80 y.o. male who presents for follow up for for his atrial fibrillation. He saw Garcia Che CNP who switched him from Amlodipine to Cardizem. He has been doing well. Patient currently denies any weight gain, edema, palpitations, chest pain, shortness of breath, dizziness, and syncope. Past Medical History:   has a past medical history of Arthritis; BPH (benign prostatic hypertrophy); Cancer ([address] Dementia; Frequency of urination; Glaucoma; Hypertension; Incontinence; and Unspecified cerebral artery occlusion with cerebral infarction. Past Surgical History:   has a past surgical history that includes Cataract removal with implant (Bilateral); Tonsillectomy; other surgical history (3/21/2013); Pacemaker insertion; hip surgery (Right, 09/06/2016); other surgical history (Right, 02/2017); other surgical history (Right, 08/15/2017); and Kidney transplant. Social History:   reports that he quit smoking about 24 years ago. He quit after 10.00 years of use. He does not have any smokeless tobacco history on file. He reports that he drinks about 3.0 oz of alcohol per week . He reports that he does not use drugs. Family History: family history includes Cancer in his mother. Allergies:  Pcn [penicillins]    Home Medications:    Prior to Admission medications    Medication Sig Start Date End Date Taking? Authorizing Provider   HYDROcodone-acetaminophen (NORCO) 5-325 MG per tablet Take 1 tablet by mouth every 8 hours as needed for Pain .  10/5/17  Yes SONIA Maciel   traMADol [place] Light) 50 MG tablet Take 1 tablet by mouth every 6 hours as needed for Pain 10/5/17 10/15/17 Yes Kayode Lopez MD   diltiazem (CARDIZEM CD) 180 MG extended release capsule Take 1 capsule by mouth daily  Patient taking differently: Take 120 mg by mouth daily  9/18/17 12/17/17 Yes Justin Cabrales CNP   diclofenac sodium (VOLTAREN) 1 % GEL APPLY 4 GRAM TO RIGHT KNEE THREE TIMES DAILY AS NEEDED FOR PAIN 5/24/17  Yes Ángel Singh MD   traMADol (ULTRAM) 50 MG tablet TAKE 1 TABLET BY MOUTH EVERY 12 HOURS AS NEEDED FOR PAIN 1/9/17  Yes Ángel Singh MD   LORazepam (ATIVAN) 1 MG tablet Take 1 mg by mouth nightly  5/16/16  Yes Historical Provider, MD   timolol (TIMOPTIC) 0.5 % ophthalmic solution Place 1 drop into both eyes daily  4/11/16  Yes Historical Provider, MD   latanoprost (XALATAN) 0.005 % ophthalmic solution Place 1 drop into both eyes nightly. Yes Historical Provider, MD   memantine (NAMENDA XR) 28 MG CP24 Take 28 mg by mouth daily. Yes Historical Provider, MD   Calcium Carbonate-Vitamin D (CALCIUM + D) 600-200 MG-UNIT TABS Take 1 tablet by mouth daily. Yes Historical Provider, MD   Rivastigmine (EXELON) 13.3 MG/24HR PT24 Place 1 patch onto the skin daily. Yes Historical Provider, MD            REVIEW OF SYSTEMS:    · Constitutional: there has been no unanticipated weight loss. There's been no change in energy level, sleep pattern, or activity level. · Eyes: No visual changes or diplopia. No scleral icterus. · ENT: No Headaches, hearing loss or vertigo. No mouth sores or sore throat. · Cardiovascular: No for chest pain, No for dyspnea on exertion, No for palpitations or No for loss of consciousness. No cough, hemoptysis, No for pleuritic pain, or phlebitis. · Respiratory: No for cough or wheezing, no sputum production. No hematemesis. · Gastrointestinal: No abdominal pain, appetite loss, blood in stools. No change in bowel or bladder habits. · Genitourinary: No dysuria, trouble voiding, or hematuria. · Musculoskeletal:  No gait disturbance, No for weakness or joint complaints. · Integumentary: No rash or pruritis. · Neurological: No headache, diplopia, change in muscle strength, numbness or tingling.  No change in gait, balance, coordination, mood, affect, concerns were addressed to the patient/family. Alternatives to my treatment were discussed. JAK Jay F.A.C.C. [facility]. [address].   [place] [address]  Phone: [phone]  Fax: [phone]   10/13/2017

## 2017-12-26 RX ORDER — TRAMADOL HYDROCHLORIDE 50 MG/1
TABLET ORAL
Qty: 28 TABLET | Refills: 0 | Status: SHIPPED | OUTPATIENT
Start: 2017-12-26 | End: 2018-12-06 | Stop reason: SDUPTHER

## 2018-05-14 ENCOUNTER — OFFICE VISIT (OUTPATIENT)
Dept: CARDIOLOGY CLINIC | Age: 83
End: 2018-05-14

## 2018-05-14 ENCOUNTER — PROCEDURE VISIT (OUTPATIENT)
Dept: CARDIOLOGY CLINIC | Age: 83
End: 2018-05-14

## 2018-05-14 VITALS
DIASTOLIC BLOOD PRESSURE: 64 MMHG | WEIGHT: 144 LBS | HEIGHT: 63 IN | HEART RATE: 72 BPM | BODY MASS INDEX: 25.52 KG/M2 | SYSTOLIC BLOOD PRESSURE: 122 MMHG

## 2018-05-14 DIAGNOSIS — Z95.0 CARDIAC PACEMAKER IN SITU: Primary | ICD-10-CM

## 2018-05-14 DIAGNOSIS — I49.1 PREMATURE ATRIAL CONTRACTION: ICD-10-CM

## 2018-05-14 DIAGNOSIS — I10 ESSENTIAL HYPERTENSION: ICD-10-CM

## 2018-05-14 DIAGNOSIS — I49.5 SINUS NODE DYSFUNCTION (HCC): Primary | ICD-10-CM

## 2018-05-14 DIAGNOSIS — R00.1 BRADYCARDIA: ICD-10-CM

## 2018-05-14 DIAGNOSIS — I47.1 PAT (PAROXYSMAL ATRIAL TACHYCARDIA) (HCC): ICD-10-CM

## 2018-05-14 PROCEDURE — 1036F TOBACCO NON-USER: CPT | Performed by: NURSE PRACTITIONER

## 2018-05-14 PROCEDURE — 1123F ACP DISCUSS/DSCN MKR DOCD: CPT | Performed by: NURSE PRACTITIONER

## 2018-05-14 PROCEDURE — G8417 CALC BMI ABV UP PARAM F/U: HCPCS | Performed by: NURSE PRACTITIONER

## 2018-05-14 PROCEDURE — G8427 DOCREV CUR MEDS BY ELIG CLIN: HCPCS | Performed by: NURSE PRACTITIONER

## 2018-05-14 PROCEDURE — G8599 NO ASA/ANTIPLAT THER USE RNG: HCPCS | Performed by: NURSE PRACTITIONER

## 2018-05-14 PROCEDURE — 99214 OFFICE O/P EST MOD 30 MIN: CPT | Performed by: NURSE PRACTITIONER

## 2018-05-14 PROCEDURE — 93280 PM DEVICE PROGR EVAL DUAL: CPT | Performed by: INTERNAL MEDICINE

## 2018-05-14 PROCEDURE — 4040F PNEUMOC VAC/ADMIN/RCVD: CPT | Performed by: NURSE PRACTITIONER

## 2018-05-14 RX ORDER — DILTIAZEM HYDROCHLORIDE 240 MG/1
240 CAPSULE, COATED, EXTENDED RELEASE ORAL DAILY
Qty: 90 CAPSULE | Refills: 3 | Status: SHIPPED | OUTPATIENT
Start: 2018-05-14 | End: 2019-01-01 | Stop reason: SDUPTHER

## 2018-07-24 ENCOUNTER — NURSE ONLY (OUTPATIENT)
Dept: ORTHOPEDIC SURGERY | Age: 83
End: 2018-07-24

## 2018-07-24 DIAGNOSIS — M16.11 PRIMARY OSTEOARTHRITIS OF RIGHT HIP: ICD-10-CM

## 2018-07-24 DIAGNOSIS — M25.561 RIGHT KNEE PAIN, UNSPECIFIED CHRONICITY: ICD-10-CM

## 2018-07-24 DIAGNOSIS — M19.011 PRIMARY OSTEOARTHRITIS OF RIGHT SHOULDER: ICD-10-CM

## 2018-07-24 DIAGNOSIS — M70.61 TROCHANTERIC BURSITIS, RIGHT HIP: Primary | ICD-10-CM

## 2018-07-24 DIAGNOSIS — M25.551 RIGHT HIP PAIN: ICD-10-CM

## 2018-07-24 DIAGNOSIS — M17.11 PRIMARY OSTEOARTHRITIS OF RIGHT KNEE: ICD-10-CM

## 2018-07-24 PROCEDURE — 20611 DRAIN/INJ JOINT/BURSA W/US: CPT | Performed by: PHYSICIAN ASSISTANT

## 2018-07-24 NOTE — PROGRESS NOTES
Chief Complaint   Patient presents with    Shoulder Pain     RIGHT SHOUDLER ARYA INJ    Knee Pain     RIGHT KNEE ARYA INJ    Hip Pain     RIGHT HIP ARYA INJ       Diagnosis: Right shoulder severe osteoarthritis, right hip severe osteoarthritis, right knee moderate osteoarthritis      He returns to clinic today for cortisone injections. He has had multiple cortisone injections in the past with good pain relief. He does have known, severe osteoarthritis and is simply trying to improve his quality of life with these injections. Updated x-rays were taken today of the right knee and right hip.    3 x-ray views of the right knee were taken and reveal moderate requirement arthritic changes with medial joint space narrowing. 3 x-ray views of the right hip were taken today and reveal absolutely end-stage osteoarthritis of the right hip with bone-on-bone findings and osteophyte formation    Orders Placed This Encounter   Procedures    XR HIP 2-3 VW W PELVIS RIGHT     Order Specific Question:   Reason for exam:     Answer:   PAIN    XR KNEE RIGHT (3 VIEWS)     Order Specific Question:   Reason for exam:     Answer:   PAIN    US ARTHR/ASP/INJ MAJOR JNT/BURSA RIGHT     Order Specific Question:   Reason for exam:     Answer:   PAIN    KS ARTHROCENTESIS ASPIR&/INJ MAJOR JT/BURSA W/O US    KS METHYLPREDNISOLONE 40 MG INJ     Right shoulder injection intra-articular:    I discussed in detail the risk, benefits, and complications of an injection which included but are not limited to infection, skin reactions, hot swollen joints, and anaphylaxis with the patient. The patient verbalized good understanding and gave informed consent for the injection. A sterile 22-gauge spinal needle was inserted posteriorly into the intra-articular space and a mixture of 2 mL of 2% Carbocaine, 2 mL of 0.25% Marcaine, and 2 mL 40mg of Depo-Medrol was injected under sterile technique.  The needle was withdrawn and the puncture site sealed

## 2018-07-25 DIAGNOSIS — M16.11 PRIMARY OSTEOARTHRITIS OF RIGHT HIP: Primary | ICD-10-CM

## 2018-07-31 ENCOUNTER — HOSPITAL ENCOUNTER (OUTPATIENT)
Dept: PHYSICAL THERAPY | Age: 83
Setting detail: THERAPIES SERIES
Discharge: HOME | End: 2018-07-31
Payer: MEDICARE

## 2018-07-31 PROCEDURE — 97162 PT EVAL MOD COMPLEX 30 MIN: CPT | Performed by: PHYSICAL THERAPIST

## 2018-07-31 PROCEDURE — G8978 MOBILITY CURRENT STATUS: HCPCS | Performed by: PHYSICAL THERAPIST

## 2018-07-31 PROCEDURE — G8980 MOBILITY D/C STATUS: HCPCS | Performed by: PHYSICAL THERAPIST

## 2018-07-31 PROCEDURE — G8979 MOBILITY GOAL STATUS: HCPCS | Performed by: PHYSICAL THERAPIST

## 2018-07-31 NOTE — FLOWSHEET NOTE
Other:  [] TA x       [] [facility] (66041)  [] ES(attended) (68641)      [] ES (un) (82547):     GOALS: NA ; pt seen for PT eval only     Progression Towards Functional goals:  [] Patient is progressing as expected towards functional goals listed. [] Progression is slowed due to complexities listed. [] Progression has been slowed due to co-morbidities.   [x] Plan just implemented, too soon to assess goals progression  [] Other:     ASSESSMENT:  See eval    Treatment/Activity Tolerance:  [x] Patient tolerated treatment well [] Patient limited by fatique  [] Patient limited by pain  [] Patient limited by other medical complications  [] Other:     Prognosis: [] Good [] Fair  [x] Poor    Patient Requires Follow-up: [x] Yes  [] No    PLAN: See eval   [] Continue per plan of care [] Alter current plan (see comments)  [x] Plan of care initiated [] Hold pending MD visit [x] Discharge    Electronically signed by: Danis Aranda, [id number]

## 2018-07-31 NOTE — PLAN OF CARE
[facility], [address] [facility]  [address] [place] [address], [address] [place] [address]  Phone: [phone]  Fax [phone]     Physical Therapy Certification    Dear Referring Practitioner: Dr. Gilbert Feliciano ,    We had the pleasure of evaluating the following patient for physical therapy services at [address] [place] [address]. A summary of our findings can be found in the initial assessment below. This includes our plan of care. If you have any questions or concerns regarding these findings, please do not hesitate to contact me at the office phone number checked above. Thank you for the referral.       Physician Signature:_______________________________Date:__________________  By signing above (or electronic signature), therapists plan is approved by physician    Patient: Katlyn Kendrick   : 1924   MRN: [id number]  Referring Physician: Referring Practitioner: Dr. Gilbert Feliciano       Evaluation Date: 2018      Medical Diagnosis Information:  Diagnosis: OA R hip , gait abnormality M16.11   Treatment Diagnosis: R hip OA M17.11                                         Insurance information: PT Insurance Information: medicare      Precautions/ Contra-indications: pacemaker, TIA several yrs ago, anxiety, OA;   Latex Allergy:  [x]NO      []YES  Preferred Language for Healthcare:   [x]English       []other:    SUBJECTIVE: Patient reports he has been walking with a 3 wheeled walker for the past 5 yrs. Pt reports he started walking with it 5 yrs ago because he was falling. Pt reports he had falls secondary to tripping and losing balance. Pt reports recently he has fallen out of bed but no falls with walking because he uses the walker at all times. Pt reports he would like to stop using the walker because it can be hard to take with you at times. Pt reports he lives in the [place] and his wife passed away last year.       Relevant Medical History:TIA's, OA, anxiety Functional Disability Index:PT G-Codes  Functional Assessment Tool Used: LEFS   Score: 71%  Functional Limitation: Mobility: Walking and moving around  Mobility: Walking and Moving Around Current Status (): At least 60 percent but less than 80 percent impaired, limited or restricted  Mobility: Walking and Moving Around Goal Status (): At least 60 percent but less than 80 percent impaired, limited or restricted  Mobility: Walking and Moving Around Discharge Status ():  At least 60 percent but less than 80 percent impaired, limited or restricted    Pain Scale: 2/10 R hip with walking with walker   Easing factors: rest   Provocative factors: getting in and out of bed and car      Type: []Constant   [x]Intermittent  []Radiating []Localized []other:     Numbness/Tingling: pt denies     Occupation/School: retired     Living Status/Prior Level of Function: Independent with ADLs and IADLs, lives alone at [facility] for past 6 yrs , no stairs; pt has been walking with the walker for the past 5 yrs     OBJECTIVE:     ROM LEFT RIGHT   HIP Flex     HIP Abd     HIP Ext     HIP IR     HIP ER     Knee ext     Knee Flex     Ankle PF     Ankle DF     Ankle In     Ankle Ev     Strength  LEFT RIGHT   HIP Flexors 4- 4-   HIP Abductors in sitting      HIP Ext     Hip ER     Knee EXT (quad) 4- 4-   Knee Flex (HS) 4+ 4+   Ankle DF 3+ 3+   Ankle PF     Ankle Inv     Ankle EV          Circumference  Mid apex  7 cm prox             Reflexes/Sensation:decreased sensation to light touch B feet     []Dermatomes/Myotomes intact    []Reflexes equal and normal bilaterally   []Other:    Joint mobility: NT    []Normal    []Hypo   []Hyper    Palpation: NT     Functional Mobility/Transfers: independent with rollator, does lock brakes before sit to stand     Posture: scoliosis, B knees slight flexed ; R knee genu varus     Bandages/Dressings/Incisions: NA     Gait:iwth 3 wheeled walker; good speed and step length     Orthopedic

## 2018-12-03 ENCOUNTER — OFFICE VISIT (OUTPATIENT)
Dept: ORTHOPEDIC SURGERY | Age: 83
End: 2018-12-03
Payer: MEDICARE

## 2018-12-03 VITALS — BODY MASS INDEX: 25.51 KG/M2 | WEIGHT: 143.96 LBS | HEIGHT: 63 IN

## 2018-12-03 DIAGNOSIS — M70.61 TROCHANTERIC BURSITIS, RIGHT HIP: ICD-10-CM

## 2018-12-03 DIAGNOSIS — M16.11 PRIMARY OSTEOARTHRITIS OF RIGHT HIP: Primary | ICD-10-CM

## 2018-12-03 DIAGNOSIS — M19.011 PRIMARY OSTEOARTHRITIS OF RIGHT SHOULDER: ICD-10-CM

## 2018-12-03 PROCEDURE — 4040F PNEUMOC VAC/ADMIN/RCVD: CPT | Performed by: ORTHOPAEDIC SURGERY

## 2018-12-03 PROCEDURE — 1036F TOBACCO NON-USER: CPT | Performed by: ORTHOPAEDIC SURGERY

## 2018-12-03 PROCEDURE — 1101F PT FALLS ASSESS-DOCD LE1/YR: CPT | Performed by: ORTHOPAEDIC SURGERY

## 2018-12-03 PROCEDURE — G8599 NO ASA/ANTIPLAT THER USE RNG: HCPCS | Performed by: ORTHOPAEDIC SURGERY

## 2018-12-03 PROCEDURE — G8484 FLU IMMUNIZE NO ADMIN: HCPCS | Performed by: ORTHOPAEDIC SURGERY

## 2018-12-03 PROCEDURE — 1123F ACP DISCUSS/DSCN MKR DOCD: CPT | Performed by: ORTHOPAEDIC SURGERY

## 2018-12-03 PROCEDURE — G8417 CALC BMI ABV UP PARAM F/U: HCPCS | Performed by: ORTHOPAEDIC SURGERY

## 2018-12-03 PROCEDURE — 99213 OFFICE O/P EST LOW 20 MIN: CPT | Performed by: ORTHOPAEDIC SURGERY

## 2018-12-03 PROCEDURE — G8427 DOCREV CUR MEDS BY ELIG CLIN: HCPCS | Performed by: ORTHOPAEDIC SURGERY

## 2018-12-05 ENCOUNTER — APPOINTMENT (OUTPATIENT)
Dept: GENERAL RADIOLOGY | Age: 83
End: 2018-12-05
Payer: MEDICARE

## 2018-12-05 ENCOUNTER — HOSPITAL ENCOUNTER (EMERGENCY)
Age: 83
Discharge: HOME | End: 2018-12-05
Attending: EMERGENCY MEDICINE
Payer: MEDICARE

## 2018-12-05 VITALS
SYSTOLIC BLOOD PRESSURE: 148 MMHG | HEART RATE: 62 BPM | RESPIRATION RATE: 18 BRPM | DIASTOLIC BLOOD PRESSURE: 82 MMHG | HEIGHT: 63 IN | OXYGEN SATURATION: 99 % | WEIGHT: 140 LBS | TEMPERATURE: 98.1 F | BODY MASS INDEX: 24.8 KG/M2

## 2018-12-05 DIAGNOSIS — R13.10 DYSPHAGIA, UNSPECIFIED TYPE: ICD-10-CM

## 2018-12-05 DIAGNOSIS — T18.108A FOREIGN BODY IN ESOPHAGUS, INITIAL ENCOUNTER: Primary | ICD-10-CM

## 2018-12-05 PROCEDURE — 71045 X-RAY EXAM CHEST 1 VIEW: CPT

## 2018-12-05 PROCEDURE — 2500000003 HC RX 250 WO HCPCS: Performed by: EMERGENCY MEDICINE

## 2018-12-05 PROCEDURE — 2500000003 HC RX 250 WO HCPCS

## 2018-12-05 PROCEDURE — S0028 INJECTION, FAMOTIDINE, 20 MG: HCPCS

## 2018-12-05 PROCEDURE — 96375 TX/PRO/DX INJ NEW DRUG ADDON: CPT

## 2018-12-05 PROCEDURE — 96374 THER/PROPH/DIAG INJ IV PUSH: CPT

## 2018-12-05 PROCEDURE — 6360000002 HC RX W HCPCS: Performed by: EMERGENCY MEDICINE

## 2018-12-05 PROCEDURE — 99283 EMERGENCY DEPT VISIT LOW MDM: CPT

## 2018-12-05 RX ORDER — ONDANSETRON 2 MG/ML
4 INJECTION INTRAMUSCULAR; INTRAVENOUS EVERY 30 MIN PRN
Status: DISCONTINUED | OUTPATIENT
Start: 2018-12-05 | End: 2018-12-05 | Stop reason: HOSPADM

## 2018-12-05 RX ORDER — MORPHINE SULFATE 2 MG/ML
4 INJECTION, SOLUTION INTRAMUSCULAR; INTRAVENOUS EVERY 30 MIN PRN
Status: DISCONTINUED | OUTPATIENT
Start: 2018-12-05 | End: 2018-12-05 | Stop reason: HOSPADM

## 2018-12-05 RX ORDER — FAMOTIDINE 20 MG/1
20 TABLET, FILM COATED ORAL 2 TIMES DAILY
Qty: 15 TABLET | Refills: 0 | Status: SHIPPED | OUTPATIENT
Start: 2018-12-05 | End: 2019-01-01

## 2018-12-05 RX ADMIN — MORPHINE SULFATE 4 MG: 2 INJECTION, SOLUTION INTRAMUSCULAR; INTRAVENOUS at 18:58

## 2018-12-05 RX ADMIN — ONDANSETRON 4 MG: 2 SOLUTION INTRAMUSCULAR; INTRAVENOUS at 18:58

## 2018-12-05 RX ADMIN — FAMOTIDINE 20 MG: 10 INJECTION, SOLUTION INTRAVENOUS at 20:19

## 2018-12-05 RX ADMIN — GLUCAGON HYDROCHLORIDE 1 MG: KIT at 18:59

## 2018-12-05 ASSESSMENT — ENCOUNTER SYMPTOMS
ABDOMINAL PAIN: 0
VOICE CHANGE: 0
BACK PAIN: 0
EYE PAIN: 0
SORE THROAT: 0
ABDOMINAL DISTENTION: 0
ANAL BLEEDING: 0
WHEEZING: 0
NAUSEA: 0
EYE ITCHING: 0
RHINORRHEA: 0
TROUBLE SWALLOWING: 0
SINUS PRESSURE: 0
PHOTOPHOBIA: 0
EYE DISCHARGE: 0
COUGH: 1
STRIDOR: 0
CHOKING: 1
VOMITING: 1
CHEST TIGHTNESS: 0
EYE REDNESS: 0
SHORTNESS OF BREATH: 0
CONSTIPATION: 0
BLOOD IN STOOL: 0
FACIAL SWELLING: 0
DIARRHEA: 0

## 2018-12-05 ASSESSMENT — PAIN SCALES - GENERAL
PAINLEVEL_OUTOF10: 5
PAINLEVEL_OUTOF10: 2

## 2018-12-05 NOTE — ED PROVIDER NOTES
Jamia Marquez is a 80year old male who presents to the ED after eating, when he got a piece of salmon stuck in his throat. He is unable to swallow anything except saliva at this time. He denies history of this in the past.         BP (!) 160/100   Pulse 58   Temp 98.1 °F (36.7 °C) (Oral)   Resp 18   Ht 5' 3\" (1.6 m)   Wt 140 lb (63.5 kg)   SpO2 98%   BMI 24.80 kg/m²     I have reviewed the following from the nursing documentation:      Prior to Admission medications    Medication Sig Start Date End Date Taking? Authorizing Provider   diclofenac sodium 1 % GEL APPLY 4 GM TO RIGHT KNEE THREE TIMES DAILY AS NEEDED FOR PAIN 10/29/18   Delfino Kim MD   diltiazem (CARDIZEM CD) 240 MG extended release capsule Take 1 capsule by mouth daily 5/14/18   JUSTUS Hubbard - CNP   traMADol (ULTRAM) 50 MG tablet TAKE 1 TABLET BY MOUTH EVERY 6 HOURS AS NEEDED FOR PAIN 12/26/17   SONIA Cosme   HYDROcodone-acetaminophen (NORCO) 5-325 MG per tablet Take 1 tablet by mouth every 8 hours as needed for Pain . 10/5/17   SONIA Crabtree   LORazepam (ATIVAN) 1 MG tablet Take 1 mg by mouth nightly  5/16/16   Historical Provider, MD   timolol (TIMOPTIC) 0.5 % ophthalmic solution Place 1 drop into both eyes daily  4/11/16   Historical Provider, MD   latanoprost (XALATAN) 0.005 % ophthalmic solution Place 1 drop into both eyes nightly. Historical Provider, MD   memantine (NAMENDA XR) 28 MG CP24 Take 28 mg by mouth daily. Historical Provider, MD   Calcium Carbonate-Vitamin D (CALCIUM + D) 600-200 MG-UNIT TABS Take 1 tablet by mouth daily. Historical Provider, MD   Rivastigmine (EXELON) 13.3 MG/24HR PT24 Place 1 patch onto the skin daily.     Historical Provider, MD       Allergies as of 12/05/2018 - Review Complete 12/05/2018   Allergen Reaction Noted    Pcn [penicillins]  03/18/2013       Past Medical History:   Diagnosis Date    Arthritis     BPH (benign prostatic hypertrophy)     Cancer ([facility] prostate    Dementia     Frequency of urination     Glaucoma     Hypertension     Incontinence     Unspecified cerebral artery occlusion with cerebral infarction     TIA in 2011        Surgical History:   Past Surgical History:   Procedure Laterality Date    CATARACT REMOVAL WITH IMPLANT Bilateral     HIP SURGERY Right 09/06/2016    arthrogram and cortisone injection    KIDNEY TRANSPLANT      OTHER SURGICAL HISTORY  3/21/2013    CYSTOSCOPY,VISUAL INTERNAL URETHROTOMY, TRANSURETHRAL    OTHER SURGICAL HISTORY Right 02/2017    hip injection    OTHER SURGICAL HISTORY Right 08/15/2017    arthrogram/cortisone injection rt hip    PACEMAKER INSERTION      TONSILLECTOMY          Family History:    Family History   Problem Relation Age of Onset    Cancer Mother        Social History     Social History    Marital status:      Spouse name: N/A    Number of children: N/A    Years of education: N/A     Occupational History    Not on file. Social History Main Topics    Smoking status: Former Smoker     Years: 10.00     Quit date: 4/5/1993    Smokeless tobacco: Never Used    Alcohol use 3.0 oz/week     5 Glasses of wine per week    Drug use: No    Sexual activity: Not on file     Other Topics Concern    Not on file     Social History Narrative    No narrative on file         Review of Systems   Constitutional: Negative for activity change, appetite change, chills, diaphoresis, fatigue and fever. HENT: Negative. Negative for congestion, dental problem, ear pain, facial swelling, rhinorrhea, sinus pressure, sneezing, sore throat, tinnitus, trouble swallowing and voice change. Eyes: Negative for photophobia, pain, discharge, redness, itching and visual disturbance. Respiratory: Positive for cough and choking. Negative for chest tightness, shortness of breath, wheezing and stridor. Cardiovascular: Negative for chest pain, palpitations and leg swelling.    Gastrointestinal: Positive for vomiting. Negative for abdominal distention, abdominal pain, anal bleeding, blood in stool, constipation, diarrhea and nausea. Genitourinary: Negative for difficulty urinating, discharge, dysuria, frequency, hematuria, testicular pain and urgency. Musculoskeletal: Negative for back pain, joint swelling, neck pain and neck stiffness. Skin: Negative for rash and wound. Neurological: Negative for dizziness, syncope, facial asymmetry, speech difficulty, weakness, numbness and headaches. Hematological: Does not bruise/bleed easily. Psychiatric/Behavioral: Negative for agitation, confusion, hallucinations, self-injury, sleep disturbance and suicidal ideas. The patient is not nervous/anxious. All other systems reviewed and are negative. Physical Exam   Constitutional: He is oriented to person, place, and time. He appears well-developed and well-nourished. No distress. HENT:   Head: Normocephalic and atraumatic. Right Ear: External ear normal.   Left Ear: External ear normal.   Nose: Nose normal.   Mouth/Throat: Oropharynx is clear and moist. No oropharyngeal exudate. Eyes: Pupils are equal, round, and reactive to light. Conjunctivae and EOM are normal. Right eye exhibits no discharge. Left eye exhibits no discharge. No scleral icterus. Neck: Normal range of motion. Neck supple. No JVD present. No tracheal deviation present. Cardiovascular: Normal rate, regular rhythm, normal heart sounds and intact distal pulses. Exam reveals no gallop and no friction rub. No murmur heard. Pulmonary/Chest: Effort normal and breath sounds normal. No respiratory distress. He has no wheezes. He has no rales. Abdominal: Soft. Bowel sounds are normal. He exhibits no distension and no mass. There is no tenderness. There is no rebound and no guarding. Musculoskeletal: Normal range of motion. He exhibits no edema or tenderness. Lymphadenopathy:     He has no cervical adenopathy.    Neurological: He is alert

## 2018-12-06 DIAGNOSIS — M16.10 PRIMARY OSTEOARTHRITIS OF HIP, UNSPECIFIED LATERALITY: Primary | ICD-10-CM

## 2018-12-06 RX ORDER — TRAMADOL HYDROCHLORIDE 50 MG/1
50 TABLET ORAL EVERY 6 HOURS PRN
Qty: 28 TABLET | Refills: 0 | Status: SHIPPED | OUTPATIENT
Start: 2018-12-06 | End: 2018-12-13

## 2019-01-01 ENCOUNTER — HOSPITAL ENCOUNTER (OUTPATIENT)
Dept: CT IMAGING | Age: 84
Discharge: HOME | End: 2019-05-09
Payer: MEDICARE

## 2019-01-01 ENCOUNTER — OFFICE VISIT (OUTPATIENT)
Dept: CARDIOLOGY CLINIC | Age: 84
End: 2019-01-01
Payer: MEDICARE

## 2019-01-01 ENCOUNTER — OFFICE VISIT (OUTPATIENT)
Dept: ORTHOPEDIC SURGERY | Age: 84
End: 2019-01-01
Payer: MEDICARE

## 2019-01-01 ENCOUNTER — NURSE ONLY (OUTPATIENT)
Dept: CARDIOLOGY CLINIC | Age: 84
End: 2019-01-01
Payer: MEDICARE

## 2019-01-01 ENCOUNTER — HOSPITAL ENCOUNTER (OUTPATIENT)
Age: 84
Setting detail: OUTPATIENT SURGERY
Discharge: HOME | End: 2019-02-19
Attending: PHYSICAL MEDICINE & REHABILITATION | Admitting: PHYSICAL MEDICINE & REHABILITATION
Payer: MEDICARE

## 2019-01-01 ENCOUNTER — APPOINTMENT (OUTPATIENT)
Dept: CT IMAGING | Age: 84
End: 2019-01-01
Payer: MEDICARE

## 2019-01-01 ENCOUNTER — TELEPHONE (OUTPATIENT)
Dept: ORTHOPEDIC SURGERY | Age: 84
End: 2019-01-01

## 2019-01-01 ENCOUNTER — HOSPITAL ENCOUNTER (OUTPATIENT)
Dept: CT IMAGING | Age: 84
Discharge: HOME | End: 2019-02-08
Payer: MEDICARE

## 2019-01-01 ENCOUNTER — HOSPITAL ENCOUNTER (OUTPATIENT)
Age: 84
Setting detail: OBSERVATION
Discharge: NURSING, REHAB OR LONG-TERM CARE | End: 2019-04-19
Attending: EMERGENCY MEDICINE | Admitting: INTERNAL MEDICINE
Payer: MEDICARE

## 2019-01-01 ENCOUNTER — TELEPHONE (OUTPATIENT)
Dept: CARDIOLOGY CLINIC | Age: 84
End: 2019-01-01

## 2019-01-01 ENCOUNTER — HOSPITAL ENCOUNTER (OUTPATIENT)
Age: 84
Setting detail: OUTPATIENT SURGERY
Discharge: HOME | End: 2019-04-23
Attending: PHYSICAL MEDICINE & REHABILITATION | Admitting: PHYSICAL MEDICINE & REHABILITATION
Payer: MEDICARE

## 2019-01-01 ENCOUNTER — PROCEDURE VISIT (OUTPATIENT)
Dept: CARDIOLOGY CLINIC | Age: 84
End: 2019-01-01

## 2019-01-01 ENCOUNTER — APPOINTMENT (OUTPATIENT)
Dept: GENERAL RADIOLOGY | Age: 84
End: 2019-01-01
Payer: MEDICARE

## 2019-01-01 VITALS
HEIGHT: 63 IN | HEART RATE: 62 BPM | OXYGEN SATURATION: 96 % | SYSTOLIC BLOOD PRESSURE: 150 MMHG | WEIGHT: 135 LBS | BODY MASS INDEX: 23.92 KG/M2 | DIASTOLIC BLOOD PRESSURE: 81 MMHG | TEMPERATURE: 97.5 F | RESPIRATION RATE: 16 BRPM

## 2019-01-01 VITALS
WEIGHT: 134.2 LBS | HEIGHT: 62 IN | TEMPERATURE: 97.6 F | DIASTOLIC BLOOD PRESSURE: 62 MMHG | HEART RATE: 58 BPM | OXYGEN SATURATION: 95 % | BODY MASS INDEX: 24.69 KG/M2 | SYSTOLIC BLOOD PRESSURE: 142 MMHG | RESPIRATION RATE: 18 BRPM

## 2019-01-01 VITALS
WEIGHT: 130.07 LBS | HEART RATE: 80 BPM | BODY MASS INDEX: 23.94 KG/M2 | SYSTOLIC BLOOD PRESSURE: 128 MMHG | HEIGHT: 62 IN | DIASTOLIC BLOOD PRESSURE: 80 MMHG

## 2019-01-01 VITALS
HEIGHT: 62 IN | TEMPERATURE: 97.3 F | SYSTOLIC BLOOD PRESSURE: 158 MMHG | RESPIRATION RATE: 16 BRPM | WEIGHT: 130 LBS | DIASTOLIC BLOOD PRESSURE: 67 MMHG | BODY MASS INDEX: 23.92 KG/M2 | HEART RATE: 60 BPM | OXYGEN SATURATION: 99 %

## 2019-01-01 VITALS
HEART RATE: 60 BPM | WEIGHT: 139.99 LBS | SYSTOLIC BLOOD PRESSURE: 118 MMHG | SYSTOLIC BLOOD PRESSURE: 130 MMHG | HEART RATE: 71 BPM | BODY MASS INDEX: 24.8 KG/M2 | HEIGHT: 63 IN | OXYGEN SATURATION: 96 % | DIASTOLIC BLOOD PRESSURE: 84 MMHG | BODY MASS INDEX: 24.27 KG/M2 | HEIGHT: 63 IN | WEIGHT: 137 LBS | DIASTOLIC BLOOD PRESSURE: 59 MMHG

## 2019-01-01 VITALS
HEART RATE: 69 BPM | BODY MASS INDEX: 23.91 KG/M2 | SYSTOLIC BLOOD PRESSURE: 177 MMHG | DIASTOLIC BLOOD PRESSURE: 87 MMHG | WEIGHT: 134.92 LBS | HEIGHT: 63 IN

## 2019-01-01 VITALS — WEIGHT: 130.07 LBS | HEIGHT: 62 IN | BODY MASS INDEX: 23.94 KG/M2

## 2019-01-01 VITALS
BODY MASS INDEX: 23.91 KG/M2 | HEIGHT: 63 IN | HEIGHT: 62 IN | WEIGHT: 134.92 LBS | WEIGHT: 130.07 LBS | BODY MASS INDEX: 23.94 KG/M2

## 2019-01-01 VITALS — HEIGHT: 62 IN | WEIGHT: 130.07 LBS | BODY MASS INDEX: 23.94 KG/M2

## 2019-01-01 DIAGNOSIS — S09.90XA INJURY OF HEAD, INITIAL ENCOUNTER: ICD-10-CM

## 2019-01-01 DIAGNOSIS — M46.1 SACROILIITIS (HCC): Primary | ICD-10-CM

## 2019-01-01 DIAGNOSIS — Z95.0 PACEMAKER: ICD-10-CM

## 2019-01-01 DIAGNOSIS — I47.29 NSVT (NONSUSTAINED VENTRICULAR TACHYCARDIA): ICD-10-CM

## 2019-01-01 DIAGNOSIS — M16.11 PRIMARY OSTEOARTHRITIS OF RIGHT HIP: ICD-10-CM

## 2019-01-01 DIAGNOSIS — M51.36 DDD (DEGENERATIVE DISC DISEASE), LUMBAR: Primary | ICD-10-CM

## 2019-01-01 DIAGNOSIS — I47.1 PAT (PAROXYSMAL ATRIAL TACHYCARDIA) (HCC): ICD-10-CM

## 2019-01-01 DIAGNOSIS — M54.16 LUMBAR RADICULITIS: ICD-10-CM

## 2019-01-01 DIAGNOSIS — M12.811 ROTATOR CUFF ARTHROPATHY OF RIGHT SHOULDER: Primary | ICD-10-CM

## 2019-01-01 DIAGNOSIS — M70.61 TROCHANTERIC BURSITIS, RIGHT HIP: Primary | ICD-10-CM

## 2019-01-01 DIAGNOSIS — M16.11 PRIMARY OSTEOARTHRITIS OF RIGHT HIP: Primary | ICD-10-CM

## 2019-01-01 DIAGNOSIS — R00.1 BRADYCARDIA: ICD-10-CM

## 2019-01-01 DIAGNOSIS — M51.36 DDD (DEGENERATIVE DISC DISEASE), LUMBAR: ICD-10-CM

## 2019-01-01 DIAGNOSIS — I10 ESSENTIAL HYPERTENSION: ICD-10-CM

## 2019-01-01 DIAGNOSIS — M19.011 PRIMARY OSTEOARTHRITIS OF RIGHT SHOULDER: ICD-10-CM

## 2019-01-01 DIAGNOSIS — M25.552 LEFT HIP PAIN: ICD-10-CM

## 2019-01-01 DIAGNOSIS — M25.511 RIGHT SHOULDER PAIN, UNSPECIFIED CHRONICITY: ICD-10-CM

## 2019-01-01 DIAGNOSIS — I49.5 SINUS NODE DYSFUNCTION (HCC): ICD-10-CM

## 2019-01-01 DIAGNOSIS — I49.5 SINUS NODE DYSFUNCTION (HCC): Primary | ICD-10-CM

## 2019-01-01 DIAGNOSIS — S51.012A SKIN TEAR OF LEFT ELBOW WITHOUT COMPLICATION, INITIAL ENCOUNTER: ICD-10-CM

## 2019-01-01 DIAGNOSIS — M25.552 LEFT HIP PAIN: Primary | ICD-10-CM

## 2019-01-01 DIAGNOSIS — M17.11 PRIMARY OSTEOARTHRITIS OF RIGHT KNEE: ICD-10-CM

## 2019-01-01 DIAGNOSIS — I49.1 PREMATURE ATRIAL CONTRACTION: ICD-10-CM

## 2019-01-01 DIAGNOSIS — M16.12 PRIMARY OSTEOARTHRITIS OF LEFT HIP: ICD-10-CM

## 2019-01-01 DIAGNOSIS — M46.1 SACROILIITIS (HCC): ICD-10-CM

## 2019-01-01 DIAGNOSIS — R55 SYNCOPE AND COLLAPSE: Primary | ICD-10-CM

## 2019-01-01 DIAGNOSIS — S41.111A SKIN TEAR OF RIGHT UPPER ARM WITHOUT COMPLICATION, INITIAL ENCOUNTER: ICD-10-CM

## 2019-01-01 DIAGNOSIS — M25.511 RIGHT SHOULDER PAIN, UNSPECIFIED CHRONICITY: Primary | ICD-10-CM

## 2019-01-01 DIAGNOSIS — Z95.0 PACEMAKER: Primary | ICD-10-CM

## 2019-01-01 LAB
A/G RATIO: 1.7 (ref 1.1–2.2)
ALBUMIN SERPL-MCNC: 4 G/DL (ref 3.4–5)
ALP BLD-CCNC: 61 U/L (ref 40–129)
ALT SERPL-CCNC: 10 U/L (ref 10–40)
ANION GAP SERPL CALCULATED.3IONS-SCNC: 11 MMOL/L (ref 3–16)
ANION GAP SERPL CALCULATED.3IONS-SCNC: 11 MMOL/L (ref 3–16)
AST SERPL-CCNC: 16 U/L (ref 15–37)
BACTERIA: ABNORMAL /HPF
BASOPHILS ABSOLUTE: 0.1 K/UL (ref 0–0.2)
BASOPHILS RELATIVE PERCENT: 0.7 %
BILIRUB SERPL-MCNC: 0.5 MG/DL (ref 0–1)
BILIRUBIN URINE: NEGATIVE
BLOOD, URINE: NEGATIVE
BUN BLDV-MCNC: 21 MG/DL (ref 7–20)
BUN BLDV-MCNC: 23 MG/DL (ref 7–20)
CALCIUM SERPL-MCNC: 8.6 MG/DL (ref 8.3–10.6)
CALCIUM SERPL-MCNC: 8.9 MG/DL (ref 8.3–10.6)
CHLORIDE BLD-SCNC: 101 MMOL/L (ref 99–110)
CHLORIDE BLD-SCNC: 105 MMOL/L (ref 99–110)
CLARITY: CLEAR
CO2: 27 MMOL/L (ref 21–32)
CO2: 28 MMOL/L (ref 21–32)
COLOR: YELLOW
CREAT SERPL-MCNC: 0.7 MG/DL (ref 0.8–1.3)
CREAT SERPL-MCNC: 0.7 MG/DL (ref 0.8–1.3)
EKG ATRIAL RATE: 62 BPM
EKG DIAGNOSIS: NORMAL
EKG P AXIS: 31 DEGREES
EKG P-R INTERVAL: 222 MS
EKG Q-T INTERVAL: 450 MS
EKG QRS DURATION: 140 MS
EKG QTC CALCULATION (BAZETT): 456 MS
EKG R AXIS: -59 DEGREES
EKG T AXIS: 3 DEGREES
EKG VENTRICULAR RATE: 62 BPM
EOSINOPHILS ABSOLUTE: 0.1 K/UL (ref 0–0.6)
EOSINOPHILS RELATIVE PERCENT: 1.3 %
EPITHELIAL CELLS, UA: ABNORMAL /HPF
GFR AFRICAN AMERICAN: >60
GFR AFRICAN AMERICAN: >60
GFR NON-AFRICAN AMERICAN: >60
GFR NON-AFRICAN AMERICAN: >60
GLOBULIN: 2.3 G/DL
GLUCOSE BLD-MCNC: 107 MG/DL (ref 70–99)
GLUCOSE BLD-MCNC: 109 MG/DL (ref 70–99)
GLUCOSE URINE: NEGATIVE MG/DL
HCT VFR BLD CALC: 37.7 % (ref 40.5–52.5)
HCT VFR BLD CALC: 37.8 % (ref 40.5–52.5)
HEMOGLOBIN: 12.7 G/DL (ref 13.5–17.5)
HEMOGLOBIN: 13.3 G/DL (ref 13.5–17.5)
INR BLD: 0.96 (ref 0.86–1.14)
KETONES, URINE: NEGATIVE MG/DL
LEUKOCYTE ESTERASE, URINE: ABNORMAL
LV EF: 58 %
LVEF MODALITY: NORMAL
LYMPHOCYTES ABSOLUTE: 1.3 K/UL (ref 1–5.1)
LYMPHOCYTES RELATIVE PERCENT: 18 %
MCH RBC QN AUTO: 33.1 PG (ref 26–34)
MCH RBC QN AUTO: 34.6 PG (ref 26–34)
MCHC RBC AUTO-ENTMCNC: 33.6 G/DL (ref 31–36)
MCHC RBC AUTO-ENTMCNC: 35.1 G/DL (ref 31–36)
MCV RBC AUTO: 98.6 FL (ref 80–100)
MCV RBC AUTO: 98.6 FL (ref 80–100)
MICROSCOPIC EXAMINATION: YES
MONOCYTES ABSOLUTE: 0.6 K/UL (ref 0–1.3)
MONOCYTES RELATIVE PERCENT: 8.5 %
NEUTROPHILS ABSOLUTE: 5.1 K/UL (ref 1.7–7.7)
NEUTROPHILS RELATIVE PERCENT: 71.5 %
NITRITE, URINE: NEGATIVE
ORGANISM: ABNORMAL
PDW BLD-RTO: 13.3 % (ref 12.4–15.4)
PDW BLD-RTO: 13.3 % (ref 12.4–15.4)
PH UA: 7 (ref 5–8)
PLATELET # BLD: 205 K/UL (ref 135–450)
PLATELET # BLD: 208 K/UL (ref 135–450)
PMV BLD AUTO: 7.7 FL (ref 5–10.5)
PMV BLD AUTO: 8.1 FL (ref 5–10.5)
POTASSIUM REFLEX MAGNESIUM: 3.9 MMOL/L (ref 3.5–5.1)
POTASSIUM REFLEX MAGNESIUM: 4.1 MMOL/L (ref 3.5–5.1)
PROTEIN UA: NEGATIVE MG/DL
PROTHROMBIN TIME: 10.9 SEC (ref 9.8–13)
RBC # BLD: 3.83 M/UL (ref 4.2–5.9)
RBC # BLD: 3.84 M/UL (ref 4.2–5.9)
RBC UA: ABNORMAL /HPF (ref 0–2)
SODIUM BLD-SCNC: 140 MMOL/L (ref 136–145)
SODIUM BLD-SCNC: 143 MMOL/L (ref 136–145)
SPECIFIC GRAVITY UA: 1.01 (ref 1–1.03)
TOTAL PROTEIN: 6.3 G/DL (ref 6.4–8.2)
TROPONIN: <0.01 NG/ML
URINE CULTURE, ROUTINE: ABNORMAL
URINE CULTURE, ROUTINE: ABNORMAL
URINE REFLEX TO CULTURE: YES
URINE TYPE: ABNORMAL
UROBILINOGEN, URINE: 0.2 E.U./DL
WBC # BLD: 7 K/UL (ref 4–11)
WBC # BLD: 7.1 K/UL (ref 4–11)
WBC UA: ABNORMAL /HPF (ref 0–5)

## 2019-01-01 PROCEDURE — 1036F TOBACCO NON-USER: CPT | Performed by: PHYSICIAN ASSISTANT

## 2019-01-01 PROCEDURE — 85027 COMPLETE CBC AUTOMATED: CPT

## 2019-01-01 PROCEDURE — 1036F TOBACCO NON-USER: CPT | Performed by: NURSE PRACTITIONER

## 2019-01-01 PROCEDURE — 97535 SELF CARE MNGMENT TRAINING: CPT

## 2019-01-01 PROCEDURE — 96372 THER/PROPH/DIAG INJ SC/IM: CPT

## 2019-01-01 PROCEDURE — 6360000004 HC RX CONTRAST MEDICATION: Performed by: PHYSICAL MEDICINE & REHABILITATION

## 2019-01-01 PROCEDURE — G8427 DOCREV CUR MEDS BY ELIG CLIN: HCPCS | Performed by: NURSE PRACTITIONER

## 2019-01-01 PROCEDURE — G0378 HOSPITAL OBSERVATION PER HR: HCPCS

## 2019-01-01 PROCEDURE — 4040F PNEUMOC VAC/ADMIN/RCVD: CPT | Performed by: PHYSICIAN ASSISTANT

## 2019-01-01 PROCEDURE — 80053 COMPREHEN METABOLIC PANEL: CPT

## 2019-01-01 PROCEDURE — 97530 THERAPEUTIC ACTIVITIES: CPT

## 2019-01-01 PROCEDURE — 1123F ACP DISCUSS/DSCN MKR DOCD: CPT | Performed by: PHYSICIAN ASSISTANT

## 2019-01-01 PROCEDURE — 6360000002 HC RX W HCPCS: Performed by: PHYSICAL MEDICINE & REHABILITATION

## 2019-01-01 PROCEDURE — 81001 URINALYSIS AUTO W/SCOPE: CPT

## 2019-01-01 PROCEDURE — 6360000002 HC RX W HCPCS: Performed by: INTERNAL MEDICINE

## 2019-01-01 PROCEDURE — 2580000003 HC RX 258: Performed by: INTERNAL MEDICINE

## 2019-01-01 PROCEDURE — 97110 THERAPEUTIC EXERCISES: CPT

## 2019-01-01 PROCEDURE — 93010 ELECTROCARDIOGRAM REPORT: CPT | Performed by: INTERNAL MEDICINE

## 2019-01-01 PROCEDURE — 4040F PNEUMOC VAC/ADMIN/RCVD: CPT | Performed by: PHYSICAL MEDICINE & REHABILITATION

## 2019-01-01 PROCEDURE — G8484 FLU IMMUNIZE NO ADMIN: HCPCS | Performed by: PHYSICAL MEDICINE & REHABILITATION

## 2019-01-01 PROCEDURE — G8420 CALC BMI NORM PARAMETERS: HCPCS | Performed by: NURSE PRACTITIONER

## 2019-01-01 PROCEDURE — 3600000002 HC SURGERY LEVEL 2 BASE: Performed by: PHYSICAL MEDICINE & REHABILITATION

## 2019-01-01 PROCEDURE — 71045 X-RAY EXAM CHEST 1 VIEW: CPT

## 2019-01-01 PROCEDURE — 99213 OFFICE O/P EST LOW 20 MIN: CPT | Performed by: PHYSICIAN ASSISTANT

## 2019-01-01 PROCEDURE — 99214 OFFICE O/P EST MOD 30 MIN: CPT | Performed by: PHYSICIAN ASSISTANT

## 2019-01-01 PROCEDURE — 85025 COMPLETE CBC W/AUTO DIFF WBC: CPT

## 2019-01-01 PROCEDURE — 87086 URINE CULTURE/COLONY COUNT: CPT

## 2019-01-01 PROCEDURE — 93005 ELECTROCARDIOGRAM TRACING: CPT | Performed by: EMERGENCY MEDICINE

## 2019-01-01 PROCEDURE — G8598 ASA/ANTIPLAT THER USED: HCPCS | Performed by: NURSE PRACTITIONER

## 2019-01-01 PROCEDURE — 1036F TOBACCO NON-USER: CPT | Performed by: PHYSICAL MEDICINE & REHABILITATION

## 2019-01-01 PROCEDURE — G8427 DOCREV CUR MEDS BY ELIG CLIN: HCPCS | Performed by: PHYSICIAN ASSISTANT

## 2019-01-01 PROCEDURE — 7100000010 HC PHASE II RECOVERY - FIRST 15 MIN: Performed by: PHYSICAL MEDICINE & REHABILITATION

## 2019-01-01 PROCEDURE — 97116 GAIT TRAINING THERAPY: CPT

## 2019-01-01 PROCEDURE — 6370000000 HC RX 637 (ALT 250 FOR IP): Performed by: NURSE PRACTITIONER

## 2019-01-01 PROCEDURE — 72125 CT NECK SPINE W/O DYE: CPT

## 2019-01-01 PROCEDURE — 2500000003 HC RX 250 WO HCPCS: Performed by: PHYSICAL MEDICINE & REHABILITATION

## 2019-01-01 PROCEDURE — 93306 TTE W/DOPPLER COMPLETE: CPT

## 2019-01-01 PROCEDURE — 2709999900 HC NON-CHARGEABLE SUPPLY: Performed by: PHYSICAL MEDICINE & REHABILITATION

## 2019-01-01 PROCEDURE — 73700 CT LOWER EXTREMITY W/O DYE: CPT

## 2019-01-01 PROCEDURE — 1123F ACP DISCUSS/DSCN MKR DOCD: CPT | Performed by: NURSE PRACTITIONER

## 2019-01-01 PROCEDURE — 1123F ACP DISCUSS/DSCN MKR DOCD: CPT | Performed by: PHYSICAL MEDICINE & REHABILITATION

## 2019-01-01 PROCEDURE — G8420 CALC BMI NORM PARAMETERS: HCPCS | Performed by: PHYSICIAN ASSISTANT

## 2019-01-01 PROCEDURE — G8484 FLU IMMUNIZE NO ADMIN: HCPCS | Performed by: PHYSICIAN ASSISTANT

## 2019-01-01 PROCEDURE — 72131 CT LUMBAR SPINE W/O DYE: CPT

## 2019-01-01 PROCEDURE — G8599 NO ASA/ANTIPLAT THER USE RNG: HCPCS | Performed by: PHYSICIAN ASSISTANT

## 2019-01-01 PROCEDURE — 84484 ASSAY OF TROPONIN QUANT: CPT

## 2019-01-01 PROCEDURE — 96360 HYDRATION IV INFUSION INIT: CPT

## 2019-01-01 PROCEDURE — 1200000000 HC SEMI PRIVATE

## 2019-01-01 PROCEDURE — 99285 EMERGENCY DEPT VISIT HI MDM: CPT

## 2019-01-01 PROCEDURE — 99222 1ST HOSP IP/OBS MODERATE 55: CPT | Performed by: INTERNAL MEDICINE

## 2019-01-01 PROCEDURE — G8598 ASA/ANTIPLAT THER USED: HCPCS | Performed by: PHYSICIAN ASSISTANT

## 2019-01-01 PROCEDURE — 1101F PT FALLS ASSESS-DOCD LE1/YR: CPT | Performed by: PHYSICIAN ASSISTANT

## 2019-01-01 PROCEDURE — G8420 CALC BMI NORM PARAMETERS: HCPCS | Performed by: PHYSICAL MEDICINE & REHABILITATION

## 2019-01-01 PROCEDURE — G8427 DOCREV CUR MEDS BY ELIG CLIN: HCPCS | Performed by: PHYSICAL MEDICINE & REHABILITATION

## 2019-01-01 PROCEDURE — 99212 OFFICE O/P EST SF 10 MIN: CPT | Performed by: ORTHOPAEDIC SURGERY

## 2019-01-01 PROCEDURE — 6370000000 HC RX 637 (ALT 250 FOR IP): Performed by: INTERNAL MEDICINE

## 2019-01-01 PROCEDURE — 2580000003 HC RX 258: Performed by: PHYSICIAN ASSISTANT

## 2019-01-01 PROCEDURE — 4040F PNEUMOC VAC/ADMIN/RCVD: CPT | Performed by: NURSE PRACTITIONER

## 2019-01-01 PROCEDURE — 1101F PT FALLS ASSESS-DOCD LE1/YR: CPT | Performed by: PHYSICAL MEDICINE & REHABILITATION

## 2019-01-01 PROCEDURE — 97161 PT EVAL LOW COMPLEX 20 MIN: CPT

## 2019-01-01 PROCEDURE — 70450 CT HEAD/BRAIN W/O DYE: CPT

## 2019-01-01 PROCEDURE — G8599 NO ASA/ANTIPLAT THER USE RNG: HCPCS | Performed by: PHYSICAL MEDICINE & REHABILITATION

## 2019-01-01 PROCEDURE — 99024 POSTOP FOLLOW-UP VISIT: CPT | Performed by: PHYSICIAN ASSISTANT

## 2019-01-01 PROCEDURE — 80048 BASIC METABOLIC PNL TOTAL CA: CPT

## 2019-01-01 PROCEDURE — 85610 PROTHROMBIN TIME: CPT

## 2019-01-01 PROCEDURE — 97167 OT EVAL HIGH COMPLEX 60 MIN: CPT

## 2019-01-01 PROCEDURE — 99203 OFFICE O/P NEW LOW 30 MIN: CPT | Performed by: PHYSICAL MEDICINE & REHABILITATION

## 2019-01-01 PROCEDURE — 99214 OFFICE O/P EST MOD 30 MIN: CPT | Performed by: NURSE PRACTITIONER

## 2019-01-01 PROCEDURE — 36415 COLL VENOUS BLD VENIPUNCTURE: CPT

## 2019-01-01 PROCEDURE — 93280 PM DEVICE PROGR EVAL DUAL: CPT | Performed by: INTERNAL MEDICINE

## 2019-01-01 RX ORDER — SODIUM CHLORIDE 0.9 % (FLUSH) 0.9 %
10 SYRINGE (ML) INJECTION EVERY 12 HOURS SCHEDULED
Status: DISCONTINUED | OUTPATIENT
Start: 2019-01-01 | End: 2019-01-01 | Stop reason: HOSPADM

## 2019-01-01 RX ORDER — TRAMADOL HYDROCHLORIDE 50 MG/1
50 TABLET ORAL EVERY 6 HOURS PRN
Qty: 28 TABLET | Refills: 0 | Status: SHIPPED | OUTPATIENT
Start: 2019-01-01 | End: 2019-01-01

## 2019-01-01 RX ORDER — METHYLPREDNISOLONE ACETATE 40 MG/ML
INJECTION, SUSPENSION INTRA-ARTICULAR; INTRALESIONAL; INTRAMUSCULAR; SOFT TISSUE PRN
Status: DISCONTINUED | OUTPATIENT
Start: 2019-01-01 | End: 2019-01-01 | Stop reason: ALTCHOICE

## 2019-01-01 RX ORDER — ONDANSETRON 2 MG/ML
4 INJECTION INTRAMUSCULAR; INTRAVENOUS EVERY 6 HOURS PRN
Status: DISCONTINUED | OUTPATIENT
Start: 2019-01-01 | End: 2019-01-01 | Stop reason: HOSPADM

## 2019-01-01 RX ORDER — CHOLECALCIFEROL (VITAMIN D3) 125 MCG
5 CAPSULE ORAL NIGHTLY PRN
Status: DISCONTINUED | OUTPATIENT
Start: 2019-01-01 | End: 2019-01-01 | Stop reason: HOSPADM

## 2019-01-01 RX ORDER — DILTIAZEM HYDROCHLORIDE 240 MG/1
CAPSULE, COATED, EXTENDED RELEASE ORAL
Qty: 90 CAPSULE | Refills: 3 | Status: SHIPPED | OUTPATIENT
Start: 2019-01-01

## 2019-01-01 RX ORDER — HYDROCODONE BITARTRATE AND ACETAMINOPHEN 5; 325 MG/1; MG/1
1 TABLET ORAL EVERY 6 HOURS PRN
Qty: 28 TABLET | Refills: 0 | Status: SHIPPED | OUTPATIENT
Start: 2019-01-01 | End: 2019-01-01 | Stop reason: SDUPTHER

## 2019-01-01 RX ORDER — DILTIAZEM HYDROCHLORIDE 240 MG/1
CAPSULE, EXTENDED RELEASE ORAL
Qty: 90 CAPSULE | Refills: 0 | Status: SHIPPED | OUTPATIENT
Start: 2019-01-01 | End: 2019-01-01

## 2019-01-01 RX ORDER — FAMOTIDINE 20 MG/1
20 TABLET, FILM COATED ORAL 2 TIMES DAILY
Status: DISCONTINUED | OUTPATIENT
Start: 2019-01-01 | End: 2019-01-01 | Stop reason: HOSPADM

## 2019-01-01 RX ORDER — LIDOCAINE HYDROCHLORIDE 10 MG/ML
INJECTION, SOLUTION EPIDURAL; INFILTRATION; INTRACAUDAL; PERINEURAL PRN
Status: DISCONTINUED | OUTPATIENT
Start: 2019-01-01 | End: 2019-01-01 | Stop reason: ALTCHOICE

## 2019-01-01 RX ORDER — LATANOPROST 50 UG/ML
1 SOLUTION/ DROPS OPHTHALMIC NIGHTLY
Status: DISCONTINUED | OUTPATIENT
Start: 2019-01-01 | End: 2019-01-01 | Stop reason: HOSPADM

## 2019-01-01 RX ORDER — DILTIAZEM HYDROCHLORIDE 240 MG/1
CAPSULE, COATED, EXTENDED RELEASE ORAL
Qty: 30 CAPSULE | Refills: 0 | Status: SHIPPED | OUTPATIENT
Start: 2019-01-01 | End: 2019-01-01 | Stop reason: SDUPTHER

## 2019-01-01 RX ORDER — HYDROCODONE BITARTRATE AND ACETAMINOPHEN 5; 325 MG/1; MG/1
1 TABLET ORAL EVERY 6 HOURS PRN
Qty: 28 TABLET | Refills: 0 | Status: SHIPPED | OUTPATIENT
Start: 2019-01-01 | End: 2020-01-01

## 2019-01-01 RX ORDER — MEMANTINE HYDROCHLORIDE 5 MG/1
10 TABLET ORAL 2 TIMES DAILY
Status: DISCONTINUED | OUTPATIENT
Start: 2019-01-01 | End: 2019-01-01 | Stop reason: HOSPADM

## 2019-01-01 RX ORDER — ACETAMINOPHEN 500 MG
500 TABLET ORAL EVERY 6 HOURS PRN
Status: DISCONTINUED | OUTPATIENT
Start: 2019-01-01 | End: 2019-01-01 | Stop reason: HOSPADM

## 2019-01-01 RX ORDER — 0.9 % SODIUM CHLORIDE 0.9 %
500 INTRAVENOUS SOLUTION INTRAVENOUS ONCE
Status: COMPLETED | OUTPATIENT
Start: 2019-01-01 | End: 2019-01-01

## 2019-01-01 RX ORDER — FESOTERODINE FUMARATE 8 MG/1
TABLET, EXTENDED RELEASE ORAL
COMMUNITY

## 2019-01-01 RX ORDER — DILTIAZEM HYDROCHLORIDE 240 MG/1
240 CAPSULE, COATED, EXTENDED RELEASE ORAL DAILY
Status: DISCONTINUED | OUTPATIENT
Start: 2019-01-01 | End: 2019-01-01 | Stop reason: HOSPADM

## 2019-01-01 RX ORDER — SODIUM CHLORIDE 0.9 % (FLUSH) 0.9 %
10 SYRINGE (ML) INJECTION PRN
Status: DISCONTINUED | OUTPATIENT
Start: 2019-01-01 | End: 2019-01-01 | Stop reason: HOSPADM

## 2019-01-01 RX ORDER — CHOLECALCIFEROL (VITAMIN D3) 125 MCG
5 CAPSULE ORAL NIGHTLY PRN
Qty: 30 TABLET | Refills: 0
Start: 2019-01-01

## 2019-01-01 RX ORDER — TIMOLOL MALEATE 5 MG/ML
1 SOLUTION/ DROPS OPHTHALMIC DAILY
Status: DISCONTINUED | OUTPATIENT
Start: 2019-01-01 | End: 2019-01-01 | Stop reason: HOSPADM

## 2019-01-01 RX ADMIN — DILTIAZEM HYDROCHLORIDE 240 MG: 240 CAPSULE, COATED, EXTENDED RELEASE ORAL at 10:21

## 2019-01-01 RX ADMIN — MELATONIN TAB 5 MG 5 MG: 5 TAB at 22:52

## 2019-01-01 RX ADMIN — ACETAMINOPHEN 500 MG: 500 TABLET ORAL at 23:03

## 2019-01-01 RX ADMIN — SODIUM CHLORIDE 500 ML: 9 INJECTION, SOLUTION INTRAVENOUS at 12:33

## 2019-01-01 RX ADMIN — ENOXAPARIN SODIUM 40 MG: 40 INJECTION SUBCUTANEOUS at 17:00

## 2019-01-01 RX ADMIN — MEMANTINE 10 MG: 5 TABLET ORAL at 10:15

## 2019-01-01 RX ADMIN — FAMOTIDINE 20 MG: 20 TABLET ORAL at 10:15

## 2019-01-01 RX ADMIN — FAMOTIDINE 20 MG: 20 TABLET ORAL at 10:21

## 2019-01-01 RX ADMIN — Medication 10 ML: at 10:14

## 2019-01-01 RX ADMIN — FAMOTIDINE 20 MG: 20 TABLET ORAL at 20:42

## 2019-01-01 RX ADMIN — TIMOLOL MALEATE 1 DROP: 5 SOLUTION OPHTHALMIC at 10:16

## 2019-01-01 RX ADMIN — DILTIAZEM HYDROCHLORIDE 240 MG: 240 CAPSULE, COATED, EXTENDED RELEASE ORAL at 10:15

## 2019-01-01 RX ADMIN — ENOXAPARIN SODIUM 40 MG: 40 INJECTION SUBCUTANEOUS at 10:14

## 2019-01-01 RX ADMIN — Medication 10 ML: at 20:42

## 2019-01-01 RX ADMIN — Medication 10 ML: at 20:02

## 2019-01-01 RX ADMIN — FAMOTIDINE 20 MG: 20 TABLET ORAL at 20:02

## 2019-01-01 RX ADMIN — Medication 10 ML: at 10:21

## 2019-01-01 RX ADMIN — ENOXAPARIN SODIUM 40 MG: 40 INJECTION SUBCUTANEOUS at 10:23

## 2019-01-01 ASSESSMENT — ENCOUNTER SYMPTOMS
VOMITING: 0
ABDOMINAL PAIN: 0
SHORTNESS OF BREATH: 0
BACK PAIN: 0
NAUSEA: 0
COUGH: 0

## 2019-01-01 ASSESSMENT — PAIN DESCRIPTION - DESCRIPTORS
DESCRIPTORS: DISCOMFORT
DESCRIPTORS: ACHING

## 2019-01-01 ASSESSMENT — PAIN DESCRIPTION - PAIN TYPE
TYPE: CHRONIC PAIN
TYPE: ACUTE PAIN

## 2019-01-01 ASSESSMENT — PAIN DESCRIPTION - LOCATION
LOCATION: ARM
LOCATION: ARM
LOCATION: HIP

## 2019-01-01 ASSESSMENT — PAIN SCALES - GENERAL
PAINLEVEL_OUTOF10: 2
PAINLEVEL_OUTOF10: 0
PAINLEVEL_OUTOF10: 3
PAINLEVEL_OUTOF10: 0
PAINLEVEL_OUTOF10: 4
PAINLEVEL_OUTOF10: 0
PAINLEVEL_OUTOF10: 0
PAINLEVEL_OUTOF10: 2
PAINLEVEL_OUTOF10: 0

## 2019-01-01 ASSESSMENT — PAIN - FUNCTIONAL ASSESSMENT
PAIN_FUNCTIONAL_ASSESSMENT: PREVENTS OR INTERFERES SOME ACTIVE ACTIVITIES AND ADLS
PAIN_FUNCTIONAL_ASSESSMENT: 0-10
PAIN_FUNCTIONAL_ASSESSMENT: 0-10
PAIN_FUNCTIONAL_ASSESSMENT: PREVENTS OR INTERFERES WITH MANY ACTIVE NOT PASSIVE ACTIVITIES

## 2019-01-01 ASSESSMENT — PAIN DESCRIPTION - ORIENTATION: ORIENTATION: RIGHT;LEFT

## 2019-01-29 ENCOUNTER — OFFICE VISIT (OUTPATIENT)
Dept: ORTHOPEDIC SURGERY | Age: 84
End: 2019-01-29
Payer: MEDICARE

## 2019-01-29 VITALS — WEIGHT: 139.99 LBS | BODY MASS INDEX: 24.8 KG/M2 | HEIGHT: 63 IN

## 2019-01-29 DIAGNOSIS — M16.11 PRIMARY OSTEOARTHRITIS OF RIGHT HIP: Primary | ICD-10-CM

## 2019-01-29 DIAGNOSIS — M17.11 PRIMARY OSTEOARTHRITIS OF RIGHT KNEE: ICD-10-CM

## 2019-01-29 DIAGNOSIS — M46.1 SACROILIITIS (HCC): ICD-10-CM

## 2019-01-29 DIAGNOSIS — M51.36 DDD (DEGENERATIVE DISC DISEASE), LUMBAR: ICD-10-CM

## 2019-01-29 PROCEDURE — 99214 OFFICE O/P EST MOD 30 MIN: CPT | Performed by: PHYSICIAN ASSISTANT

## 2019-01-29 PROCEDURE — 1101F PT FALLS ASSESS-DOCD LE1/YR: CPT | Performed by: PHYSICIAN ASSISTANT

## 2019-01-29 PROCEDURE — 1123F ACP DISCUSS/DSCN MKR DOCD: CPT | Performed by: PHYSICIAN ASSISTANT

## 2019-01-29 PROCEDURE — G8420 CALC BMI NORM PARAMETERS: HCPCS | Performed by: PHYSICIAN ASSISTANT

## 2019-01-29 PROCEDURE — G8599 NO ASA/ANTIPLAT THER USE RNG: HCPCS | Performed by: PHYSICIAN ASSISTANT

## 2019-01-29 PROCEDURE — G8484 FLU IMMUNIZE NO ADMIN: HCPCS | Performed by: PHYSICIAN ASSISTANT

## 2019-01-29 PROCEDURE — G8427 DOCREV CUR MEDS BY ELIG CLIN: HCPCS | Performed by: PHYSICIAN ASSISTANT

## 2019-01-29 PROCEDURE — 1036F TOBACCO NON-USER: CPT | Performed by: PHYSICIAN ASSISTANT

## 2019-01-29 PROCEDURE — 4040F PNEUMOC VAC/ADMIN/RCVD: CPT | Performed by: PHYSICIAN ASSISTANT

## 2019-01-29 RX ORDER — METHYLPREDNISOLONE 4 MG/1
TABLET ORAL
Qty: 1 KIT | Refills: 0 | Status: SHIPPED | OUTPATIENT
Start: 2019-01-29 | End: 2019-01-01

## 2019-04-10 NOTE — PROGRESS NOTES
Ashley Rodgers  [facility]#[id number]  LOT#- [id number]  EXP:12/2022    4CC LIDOCAINE  WSS#[id number]  LOT#--[id number]  EXP: 10/2020    2CC DEPO  NDC#-[id number]  LOT#- [id number]  EXP: 12/2020    SITE: RIGHT KNEE
T`he patient was instructed to call the office immediately if there is any pain, redness, warmth, fever, or chills.

## 2019-04-11 NOTE — TELEPHONE ENCOUNTER
DOS   04/23/2019  CPT   [address]  DX   M54.16  M46.1  OP SX AUTH  NPR    RIGHT  LEVELS   L4 - L5    PROCEDURE   TRANSFORAMINAL EPIDURAL INJECTION   [address]:   MEDICARE

## 2019-04-17 PROBLEM — R55 SYNCOPE: Status: ACTIVE | Noted: 2019-01-01

## 2019-04-17 PROBLEM — R55 SYNCOPE AND COLLAPSE: Status: ACTIVE | Noted: 2019-01-01

## 2019-04-17 PROBLEM — F03.90 DEMENTIA (HCC): Status: ACTIVE | Noted: 2019-01-01

## 2019-04-17 NOTE — ED NOTES
[address] @ 8649 per Lenard Chopra  RE[facility]  04/17/19 4092

## 2019-04-17 NOTE — ED NOTES
Completed wound care to skin tears on pt's left elbow and right upper arm. Cleaned with Zohreh-Hex and Saline, then applied Bacitracin. Covered with Versatel, gauze 4 by 4 pads, and Kerlix. PT is resting comfortably at this time.      Maya Ken  04/17/19 145

## 2019-04-17 NOTE — ED PROVIDER NOTES
for neck pain. Negative for back pain and joint swelling. Skin: Positive for wound (skin tears). Neurological: Positive for syncope. Negative for dizziness, weakness and headaches. All other systems reviewed and are negative. Exceptas noted above in the ROS, all other systems were reviewed and negative. PAST MEDICAL HISTORY:     Past Medical History:   Diagnosis Date    Arthritis     BPH (benign prostatic hypertrophy)     Cancer (HCC)     prostate    Dementia     Frequency of urination     Glaucoma     Hypertension     Incontinence     Unspecified cerebral artery occlusion with cerebral infarction     TIA in 2011         SURGICAL HISTORY:      Past Surgical History:   Procedure Laterality Date    CATARACT REMOVAL WITH IMPLANT Bilateral     EPIDURAL STEROID INJECTION Right 2/19/2019    RIGHT INTRA-ARTICULAR RIGHT SACROILIAC INJECTION SITE CONFIRMED BY FLUOROSCOPY performed by Olga Díaz MD at [address] [facility] Right 09/06/2016    arthrogram and cortisone injection    KIDNEY TRANSPLANT      OTHER SURGICAL HISTORY  3/21/2013    CYSTOSCOPY,VISUAL INTERNAL URETHROTOMY, TRANSURETHRAL    OTHER SURGICAL HISTORY Right 02/2017    hip injection    OTHER SURGICAL HISTORY Right 08/15/2017    arthrogram/cortisone injection rt hip    PACEMAKER INSERTION      TONSILLECTOMY           CURRENT MEDICATIONS:       Previous Medications    CALCIUM CARBONATE-VITAMIN D (CALCIUM + D) 600-200 MG-UNIT TABS    Take 1 tablet by mouth daily.     DICLOFENAC SODIUM 1 % GEL    APPLY 4 GM TO RIGHT KNEE THREE TIMES DAILY AS NEEDED FOR PAIN    DICLOFENAC SODIUM 1 % GEL    Apply 4 g topically 4 times daily    DILTIAZEM (CARDIZEM CD) 240 MG EXTENDED RELEASE CAPSULE    Take 1 capsule by mouth daily    FAMOTIDINE (PEPCID) 20 MG TABLET    Take 1 tablet by mouth 2 times daily    FESOTERODINE FUMARATE ER (TOVIAZ) 8 MG TB24    Take by mouth    LATANOPROST (XALATAN) 0.005 % OPHTHALMIC SOLUTION    Place 1 drop into both eyes nightly. LORAZEPAM (ATIVAN) 1 MG TABLET    Take 1 mg by mouth nightly     MEMANTINE (NAMENDA XR) 28 MG CP24    Take 28 mg by mouth daily. RIVASTIGMINE (EXELON) 13.3 MG/24HR PT24    Place 1 patch onto the skin daily. TIMOLOL (TIMOPTIC) 0.5 % OPHTHALMIC SOLUTION    Place 1 drop into both eyes daily          ALLERGIES:    Pcn [penicillins]    FAMILY HISTORY:       Family History   Problem Relation Age of Onset    Cancer Mother           SOCIAL HISTORY:       Social History     Socioeconomic History    Marital status:      Spouse name: None    Number of children: None    Years of education: None    Highest education level: None   Occupational History    None   Social Needs    Financial resource strain: None    Food insecurity:     Worry: None     Inability: None    Transportation needs:     Medical: None     Non-medical: None   Tobacco Use    Smoking status: Former Smoker     Years: 10.00     Last attempt to quit: 1993     Years since quittin.0    Smokeless tobacco: Never Used   Substance and Sexual Activity    Alcohol use:  Yes    Drug use: No    Sexual activity: None   Lifestyle    Physical activity:     Days per week: None     Minutes per session: None    Stress: None   Relationships    Social connections:     Talks on phone: None     Gets together: None     Attends [employer]ism service: None     Active member of club or organization: None     Attends meetings of clubs or organizations: None     Relationship status: None    Intimate partner violence:     Fear of current or ex partner: None     Emotionally abused: None     Physically abused: None     Forced sexual activity: None   Other Topics Concern    None   Social History Narrative    None       SCREENINGS:             PHYSICAL EXAM:       ED Triage Vitals [19 1051]   BP Temp Temp Source Pulse Resp SpO2 Height Weight   (!) 167/69 97.5 °F (36.4 °C) Oral 60 17 95 % 5' 2\" (1.575 m) 138 lb (62.6 kg) 3.5 - 5.1 mmol/L    Chloride 101 99 - 110 mmol/L    CO2 28 21 - 32 mmol/L    Anion Gap 11 3 - 16    Glucose 109 (H) 70 - 99 mg/dL    BUN 23 (H) 7 - 20 mg/dL    CREATININE 0.7 (L) 0.8 - 1.3 mg/dL    GFR Non-African American >60 >60    GFR African American >60 >60    Calcium 8.9 8.3 - 10.6 mg/dL    Total Protein 6.3 (L) 6.4 - 8.2 g/dL    Alb 4.0 3.4 - 5.0 g/dL    Albumin/Globulin Ratio 1.7 1.1 - 2.2    Total Bilirubin 0.5 0.0 - 1.0 mg/dL    Alkaline Phosphatase 61 40 - 129 U/L    ALT 10 10 - 40 U/L    AST 16 15 - 37 U/L    Globulin 2.3 g/dL   Troponin   Result Value Ref Range    Troponin <0.01 <0.01 ng/mL   Protime-INR   Result Value Ref Range    Protime 10.9 9.8 - 13.0 sec    INR 0.96 0.86 - 1.14   Urinalysis Reflex to Culture   Result Value Ref Range    Color, UA Yellow Straw/Yellow    Clarity, UA Clear Clear    Glucose, Ur Negative Negative mg/dL    Bilirubin Urine Negative Negative    Ketones, Urine Negative Negative mg/dL    Specific Gravity, UA 1.010 1.005 - 1.030    Blood, Urine Negative Negative    pH, UA 7.0 5.0 - 8.0    Protein, UA Negative Negative mg/dL    Urobilinogen, Urine 0.2 <2.0 E.U./dL    Nitrite, Urine Negative Negative    Leukocyte Esterase, Urine SMALL (A) Negative    Microscopic Examination YES     Urine Reflex to Culture Yes     Urine Type Not Specified    Microscopic Urinalysis   Result Value Ref Range    WBC, UA 20-50 (A) 0 - 5 /HPF    RBC, UA 0-2 0 - 2 /HPF    Epi Cells 3-5 /HPF    Bacteria, UA 3+ (A) /HPF   EKG 12 Lead   Result Value Ref Range    Ventricular Rate 62 BPM    Atrial Rate 62 BPM    P-R Interval 222 ms    QRS Duration 140 ms    Q-T Interval 450 ms    QTc Calculation (Bazett) 456 ms    P Axis 31 degrees    R Axis -59 degrees    T Axis 3 degrees    Diagnosis       Sinus rhythm with sinus arrhythmia with 1st degree A-V blockRight bundle branch blockLeft anterior fascicular block** Bifascicular block **Septal infarct , age undeterminedAbnormal ECGWhen compared with ECG of 28-MAR-2018 21:25,Sinus rhythm has replaced Electronic ventricular pacemaker         RADIOLOGY:  All x-ray studies areviewed/reviewed by me. Formal interpretations per the radiologist are as follows:      Ct Head Wo Contrast    Result Date: 4/17/2019  EXAMINATION: CT OF THE HEAD WITHOUT CONTRAST  4/17/2019 12:27 pm TECHNIQUE: CT of the head was performed without the administration of intravenous contrast. Dose modulation, iterative reconstruction, and/or weight based adjustment of the mA/kV was utilized to reduce the radiation dose to as low as reasonably achievable. COMPARISON: 06/12/2015 HISTORY: ORDERING SYSTEM PROVIDED HISTORY: fall, head injury TECHNOLOGIST PROVIDED HISTORY: Has a \"code stroke\" or \"stroke alert\" been called? ->No Ordering Physician Provided Reason for Exam: fell in bathroom today- hit back of head-no LOC Acuity: Acute Type of Exam: Initial Relevant Medical/Surgical History: hx-TIA's FINDINGS: BRAIN/VENTRICLES: There is no acute intracerebral hemorrhage or extra-axial fluid collection. There is moderate cerebral atrophy with moderate periventricular, subcortical and deep white matter small vessel ischemic disease. ORBITS: Status post bilateral cataract removal. SINUSES: There is partial opacification of the right mastoid air cells. SOFT TISSUES/SKULL:  The calvarium is intact. A small left posterior scalp hematoma is noted. No change in the 2 mm radiopaque foreign body within the left frontal scalp. 1. No acute intracranial abnormality. Ct Cervical Spine Wo Contrast    Result Date: 4/17/2019  EXAMINATION: CT OF THE CERVICAL SPINE WITHOUT CONTRAST 4/17/2019 12:27 pm TECHNIQUE: CT of the cervical spine was performed without the administration of intravenous contrast. Multiplanar reformatted images are provided for review. Dose modulation, iterative reconstruction, and/or weight based adjustment of the mA/kV was utilized to reduce the radiation dose to as low as reasonably achievable. COMPARISON: CTA neck on 06/13/2015. HISTORY: ORDERING SYSTEM PROVIDED HISTORY: fall TECHNOLOGIST PROVIDED HISTORY: Ordering Physician Provided Reason for Exam: fell in bathroom today hitting back of head- denies neck pain Type of Exam: Initial Relevant Medical/Surgical History: no surg FINDINGS: BONES/ALIGNMENT: There is 3 mm anterolisthesis at C6-C7, most likely degenerative and unchanged from 2014. The cervical vertebral bodies appear overall normal in height. No evidence of acute fracture. Bridging osteophytes and/or anterior longitudinal ligament ossification are noted in the cervical and upper thoracic spine. DEGENERATIVE CHANGES: Severe multilevel disc space narrowing. Multilevel facet and uncovertebral hypertrophy. At least mild multilevel spinal canal stenosis. SOFT TISSUES: There is no prevertebral soft tissue swelling. No evidence of acute fracture. Xr Chest Portable    Result Date: 4/17/2019  EXAMINATION: SINGLE XRAY VIEW OF THE CHEST 4/17/2019 11:43 am COMPARISON: 12/05/2018 HISTORY: ORDERING SYSTEM PROVIDED HISTORY: fall TECHNOLOGIST PROVIDED HISTORY: Reason for exam:->fall Ordering Physician Provided Reason for Exam:  fall Acuity: Acute Type of Exam: Initial FINDINGS: Heart size is mildly enlarged. A dual lead pacemaker is in place. There is calcification of the thoracic aorta. There is no pneumothorax. There is mild left basilar atelectasis. A fracture is not identified. Mild left basilar atelectasis. Otherwise, no evidence of acute cardiopulmonary disease. EKG:    See interpretation by Coleman Henson DO. PROCEDURES:   N/A    CRITICAL CARE TIME:       Due to the immediate potential for life-threatening deterioration due to sycope, I spent 22 minutes providing critical care. This time is excluding time spent performing procedures.       CONSULTS:  IP CONSULT TO HOSPITALIST      EMERGENCY DEPARTMENT COURSE and DIFFERENTIAL DIAGNOSIS/MDM:   Vitals:    Vitals: 04/17/19 1051 04/17/19 1349 04/17/19 1456   BP: (!) 167/69 (!) 162/64 (!) 166/81   Pulse: 60 64 61   Resp: 17 17 17   Temp: 97.5 °F (36.4 °C)     TempSrc: Oral     SpO2: 95% 96% 96%   Weight: 138 lb (62.6 kg)     Height: 5' 2\" (1.575 m)         Patient was given the following medications:  Medications   0.9 % sodium chloride bolus (0 mLs Intravenous Stopped 4/17/19 1349)         Patient was evaluated by both myself and Lashay Hilario DO. This patient suffered a syncopal episode in his bathroom this morning. He hit his head and suffered skin tears to his arms. The patient had no preceding feeling of lightheadedness or dizziness. He did not feel that he might pass out. Additionally he had no memorable tripping incident. He just remembers waking up on the ground. A CT of his head and cervical spine are negative. Chest x-ray is unremarkable. A CBC, CMP, troponin and coags are normal.  His urinalysis shows small leukocytes, micro-urinalysis pending. The patient was given a 1 L bolus of normal saline while in the. His wounds were all cleansed and bandaged. This patient will require hospitalization for further workup to evaluate the syncopal episode. I am consulting the hospitalist.    I spoke with Dr. Ann-Marie Mon. We thoroughly discussed the history, physical exam, laboratory and imaging studies, as well as, emergency department course. Based upon that discussion, we've decided to admit [facility] for further observation and evaluation of Santos Souza's syncopal episode. As I have deemed necessary from their history, physical and studies, I have considered and evaluated [facility] for the following diagnoses:  SEPSIS, MENINGITIS, DIABETIC COMPLICATIONS, INTRACRANIAL HEMORRHAGE, SUBARACHNOID HEMORRHAGE, SUBDURAL HEMATOMA, & STROKE. FINAL IMPRESSION:      1. Syncope and collapse    2. Injury of head, initial encounter    3. Skin tear of left elbow without complication, initial encounter    4.  Skin tear of right upper arm without complication, initial encounter          DISPOSITION/PLAN:   DISPOSITION     ADMIT               (Please note thatportions of this note were completed with a voice recognition program.  Efforts were made to edit the dictations, but occasionally words are mis-transcribed.)    Gabe Feliciano PA-C (electronicallysigned)              SONIA Kimball  04/17/19 0662

## 2019-04-17 NOTE — H&P
injection rt hip    PACEMAKER INSERTION      TONSILLECTOMY         Medications Prior to Admission:      Prior to Admission medications    Medication Sig Start Date End Date Taking? Authorizing Provider   Fesoterodine Fumarate ER (TOVIAZ) 8 MG TB24 Take by mouth    Historical Provider, MD   diclofenac sodium 1 % GEL Apply 4 g topically 4 times daily 1/29/19   SONIA Henry   diclofenac sodium 1 % GEL APPLY 4 GM TO RIGHT KNEE THREE TIMES DAILY AS NEEDED FOR PAIN 12/6/18   Cynthia Saldaña MD   famotidine (PEPCID) 20 MG tablet Take 1 tablet by mouth 2 times daily 12/5/18   Gisel Calloway MD   diltiazem (CARDIZEM CD) 240 MG extended release capsule Take 1 capsule by mouth daily 5/14/18   JUSTUS Guillen - CNP   LORazepam (ATIVAN) 1 MG tablet Take 1 mg by mouth nightly  5/16/16   Historical Provider, MD   timolol (TIMOPTIC) 0.5 % ophthalmic solution Place 1 drop into both eyes daily  4/11/16   Historical Provider, MD   latanoprost (XALATAN) 0.005 % ophthalmic solution Place 1 drop into both eyes nightly. Historical Provider, MD   memantine (NAMENDA XR) 28 MG CP24 Take 28 mg by mouth daily. Historical Provider, MD   Calcium Carbonate-Vitamin D (CALCIUM + D) 600-200 MG-UNIT TABS Take 1 tablet by mouth daily. Historical Provider, MD   Rivastigmine (EXELON) 13.3 MG/24HR PT24 Place 1 patch onto the skin daily. Historical Provider, MD       Allergies:  Pcn [penicillins]    Social History:      The patient currently lives at assisted living    TOBACCO:   reports that he quit smoking about 26 years ago. He quit after 10.00 years of use. He has never used smokeless tobacco.  ETOH:   reports that he drinks alcohol. Family History:      Reviewed in detail and negative for DM, CAD, CVA. Positive as follows:        Problem Relation Age of Onset    Cancer Mother        REVIEW OF SYSTEMS:   Pertinent positives as noted in the HPI. All other systems reviewed and negative.     PHYSICAL EXAM PERFORMED:    BP (!) 162/64   Pulse 64   Temp 97.5 °F (36.4 °C) (Oral)   Resp 17   Ht 5' 2\" (1.575 m)   Wt 138 lb (62.6 kg)   SpO2 96%   BMI 25.24 kg/m²     General appearance:  No apparent distress, appears stated age and cooperative. HEENT:  Normal cephalic, atraumatic without obvious deformity. Pupils equal, round, and reactive to light. Extra ocular muscles intact. Conjunctivae/corneas clear. Neck: Supple, with full range of motion. No jugular venous distention. Trachea midline. Respiratory:  Normal respiratory effort. Clear to auscultation, bilaterally without Rales/Wheezes/Rhonchi. Cardiovascular:  Regular rate and rhythm with normal S1/S2 without murmurs, rubs or gallops. Abdomen: Soft, non-tender, non-distended with normal bowel sounds. Musculoskeletal:  No clubbing, cyanosis or edema bilaterally. Full range of motion without deformity. Skin: Skin color, texture, turgor normal.  Bruising on BLE and occiput without any wounds. Neurologic:  Neurovascularly intact without any focal sensory/motor deficits.  Cranial nerves: II-XII intact, grossly non-focal.  Psychiatric:  Alert and oriented, thought content appropriate, normal insight  Capillary Refill: Brisk,< 3 seconds   Peripheral Pulses: +2 palpable, equal bilaterally       Labs:     Recent Labs     04/17/19  1159   WBC 7.1   HGB 13.3*   HCT 37.8*        Recent Labs     04/17/19  1159      K 4.1      CO2 28   BUN 23*   CREATININE 0.7*   CALCIUM 8.9     Recent Labs     04/17/19  1159   AST 16   ALT 10   BILITOT 0.5   ALKPHOS 61     Recent Labs     04/17/19  1159   INR 0.96     Recent Labs     04/17/19  1159   TROPONINI <0.01       Urinalysis:      Lab Results   Component Value Date    NITRU Negative 04/17/2019    WBCUA 0-2 06/12/2015    BACTERIA Rare 11/25/2014    RBCUA 3-5 06/12/2015    BLOODU Negative 04/17/2019    SPECGRAV 1.010 04/17/2019    GLUCOSEU Negative 04/17/2019       Radiology:     CXR: I have reviewed the CXR with the following interpretation: mild atelectasis  EKG:  I have reviewed the EKG with the following interpretation: NSR with 1st degree AV block    CT CERVICAL SPINE WO CONTRAST   Final Result   No evidence of acute fracture. CT HEAD WO CONTRAST   Final Result   1. No acute intracranial abnormality. XR CHEST PORTABLE   Preliminary Result   Mild left basilar atelectasis. Otherwise, no evidence of acute   cardiopulmonary disease. ASSESSMENT:    Active Hospital Problems    Diagnosis Date Noted    Syncope [R55] 04/17/2019    Dementia [F03.90] 04/17/2019    Syncope and collapse [R55] 04/17/2019    Essential hypertension [I10]          PLAN:  Syncope and fall  - unclear etiology  - CT head negative  - Echo ordered  - tele  - will check orthostatics  - holding cardizem for now  - cardiology consulted given SVT history    HTN  - despite poor control, will hold BP medications until syncope better addressed  - will continue to monitor    Dementia  - holding namenda, rivastigmine for now    DVT Prophylaxis: lovenox  Diet: No diet orders on file  Code Status: Prior    PT/OT Eval Status: ordered    Dispo - 1-2 days pending work up       Edil Odell MD    Thank you Jonathan Mazariegos MD for the opportunity to be involved in this patient's care. If you have any questions or concerns please feel free to contact me at [phone].

## 2019-04-17 NOTE — ED PROVIDER NOTES
I independently performed a history and physical on Amita Bravo. All diagnostic, treatment, and disposition decisions were made by myself in conjunction with the advanced practice provider. For further details of [facility] emergency department encounter, please see advanced practice provider's documentation    This is a 59-year-old male presenting for evaluation after he passed out. Patient does not recall circumstances around passing out or why he passed out    Physical examination: No focal neurological deficits    The Ekg interpreted by me shows  normal sinus rhythm with a rate of 62 with sinus arrhythmia  Axis is   Normal  QTc is  normal  Right bundle-branch block. Left anterior fascicular block. First-degree AV block    ST Segments: nonspecific changes    Ct Head Wo Contrast    Result Date: 4/17/2019  EXAMINATION: CT OF THE HEAD WITHOUT CONTRAST  4/17/2019 12:27 pm TECHNIQUE: CT of the head was performed without the administration of intravenous contrast. Dose modulation, iterative reconstruction, and/or weight based adjustment of the mA/kV was utilized to reduce the radiation dose to as low as reasonably achievable. COMPARISON: 06/12/2015 HISTORY: ORDERING SYSTEM PROVIDED HISTORY: fall, head injury TECHNOLOGIST PROVIDED HISTORY: Has a \"code stroke\" or \"stroke alert\" been called? ->No Ordering Physician Provided Reason for Exam: fell in bathroom today- hit back of head-no LOC Acuity: Acute Type of Exam: Initial Relevant Medical/Surgical History: hx-TIA's FINDINGS: BRAIN/VENTRICLES: There is no acute intracerebral hemorrhage or extra-axial fluid collection. There is moderate cerebral atrophy with moderate periventricular, subcortical and deep white matter small vessel ischemic disease. ORBITS: Status post bilateral cataract removal. SINUSES: There is partial opacification of the right mastoid air cells. SOFT TISSUES/SKULL:  The calvarium is intact. A small left posterior scalp hematoma is noted. No change in the 2 mm radiopaque foreign body within the left frontal scalp. 1. No acute intracranial abnormality. Ct Cervical Spine Wo Contrast    Result Date: 4/17/2019  EXAMINATION: CT OF THE CERVICAL SPINE WITHOUT CONTRAST 4/17/2019 12:27 pm TECHNIQUE: CT of the cervical spine was performed without the administration of intravenous contrast. Multiplanar reformatted images are provided for review. Dose modulation, iterative reconstruction, and/or weight based adjustment of the mA/kV was utilized to reduce the radiation dose to as low as reasonably achievable. COMPARISON: CTA neck on 06/13/2015. HISTORY: ORDERING SYSTEM PROVIDED HISTORY: fall TECHNOLOGIST PROVIDED HISTORY: Ordering Physician Provided Reason for Exam: fell in bathroom today hitting back of head- denies neck pain Type of Exam: Initial Relevant Medical/Surgical History: no surg FINDINGS: BONES/ALIGNMENT: There is 3 mm anterolisthesis at C6-C7, most likely degenerative and unchanged from 2014. The cervical vertebral bodies appear overall normal in height. No evidence of acute fracture. Bridging osteophytes and/or anterior longitudinal ligament ossification are noted in the cervical and upper thoracic spine. DEGENERATIVE CHANGES: Severe multilevel disc space narrowing. Multilevel facet and uncovertebral hypertrophy. At least mild multilevel spinal canal stenosis. SOFT TISSUES: There is no prevertebral soft tissue swelling. No evidence of acute fracture. Xr Chest Portable    Result Date: 4/17/2019  EXAMINATION: SINGLE XRAY VIEW OF THE CHEST 4/17/2019 11:43 am COMPARISON: 12/05/2018 HISTORY: ORDERING SYSTEM PROVIDED HISTORY: fall TECHNOLOGIST PROVIDED HISTORY: Reason for exam:->fall Ordering Physician Provided Reason for Exam:  fall Acuity: Acute Type of Exam: Initial FINDINGS: Heart size is mildly enlarged. A dual lead pacemaker is in place. There is calcification of the thoracic aorta. There is no pneumothorax.   There is mild left basilar atelectasis. A fracture is not identified. Mild left basilar atelectasis. Otherwise, no evidence of acute cardiopulmonary disease.      Us Arthr/asp/inj Major Jnt/bursa Right    Result Date: 4/10/2019  Radiology result is complete; follow up with provider / physician office for radiology results Richard Garduno DO  04/17/19 [id number]

## 2019-04-17 NOTE — PROGRESS NOTES
Pt admitted to room 355-2 via transport. Tele box #116 in place, pt aware of placement and reason. Oriented to room, bed rails, call light and bathroom. Pt aware of prescribed diet and how to order using 8menu. Explained plan of day including ordered tests and consults. Call light in reach. Will monitor.  Michael Lemus

## 2019-04-18 NOTE — PROGRESS NOTES
Pt walked in the hallway with this RN with the assist of a walker. Pt's gait slow but steady. Pt tolerated well. Pt repositioned back into chair. Chair alarm on and in place. Bedside table and call light within reach. Will monitor.

## 2019-04-18 NOTE — PROGRESS NOTES
Report given to [facility]. Call light and bedside table within reach. No needs voiced at this time. Chair alarm on and in place.

## 2019-04-18 NOTE — PROGRESS NOTES
Paged Humberto Anthony MD: Evangelina Byrd know you called me regarding this pt this morning. Daughter at bedside and pt and daughter would like to speak with you regarding medications. Thank you. \"

## 2019-04-18 NOTE — CONSULTS
Patient Name: Camryn Bagley  Date of admission: 4/17/2019 10:46 AM  Patient's age: 80 y.o., 6/17/1924  Admission Dx: Syncope and collapse [R55]  Syncope and collapse [R55]    Reason for Consult:  Mechanical fall vs syncope  Requesting Physician: Nilson Ledbetter MD  Primary Care physician: Kierra Solomon MD      History Obtained From:  patient    HISTORY OF PRESENT ILLNESS:      The patient is a 80 y.o.  male who is admitted to the hospital for ?syncope. Patient lives in [facility]. He was brushing his teeth when he had fall. He denies passing out. Patient has previous mechanical falls. Patient currently denies any weight gain, edema, palpitations, chest pain, shortness of breath, dizziness, and syncope. He has dual chamber pacemaker for sinus node dysfunction. Please see admission H&P for further details. The patient is not able to give detailed information about the events of hospitalization due to their underlying medical illness. The majority of the information is taken from the chart, medical staff, and available family. Past Medical History:   has a past medical history of Arthritis, BPH (benign prostatic hypertrophy), Cancer ([facility] [address] Dementia, Frequency of urination, Glaucoma, Hypertension, Incontinence, and Unspecified cerebral artery occlusion with cerebral infarction. Past Surgical History:   has a past surgical history that includes Cataract removal with implant (Bilateral); Tonsillectomy; other surgical history (3/21/2013); Pacemaker insertion; hip surgery (Right, 09/06/2016); other surgical history (Right, 02/2017); other surgical history (Right, 08/15/2017); Kidney transplant; and epidural steroid injection (Right, 2/19/2019). Home Medications:    Prior to Admission medications    Medication Sig Start Date End Date Taking?  Authorizing Provider   Fesoterodine Fumarate ER (TOVIAZ) 8 MG TB24 Take by mouth   Yes Historical Provider, MD diclofenac sodium 1 % GEL APPLY 4 GM TO RIGHT KNEE THREE TIMES DAILY AS NEEDED FOR PAIN 12/6/18  Yes Radha Rodriguez MD   diltiazem (CARDIZEM CD) 240 MG extended release capsule Take 1 capsule by mouth daily 5/14/18  Yes JUSTUS Argueta - ERICKA   LORazepam (ATIVAN) 1 MG tablet Take 1 mg by mouth nightly  5/16/16  Yes Historical Provider, MD   timolol (TIMOPTIC) 0.5 % ophthalmic solution Place 1 drop into both eyes daily  4/11/16  Yes Historical Provider, MD   latanoprost (XALATAN) 0.005 % ophthalmic solution Place 1 drop into both eyes nightly. Yes Historical Provider, MD   memantine (NAMENDA XR) 28 MG CP24 Take 28 mg by mouth daily. Yes Historical Provider, MD   Calcium Carbonate-Vitamin D (CALCIUM + D) 600-200 MG-UNIT TABS Take 1 tablet by mouth daily. Yes Historical Provider, MD   Rivastigmine (EXELON) 13.3 MG/24HR PT24 Place 1 patch onto the skin daily. Yes Historical Provider, MD   diclofenac sodium 1 % GEL Apply 4 g topically 4 times daily 1/29/19   SONIA Mendieta   famotidine (PEPCID) 20 MG tablet Take 1 tablet by mouth 2 times daily 12/5/18   Daleen Osler, MD       Allergies:  Pcn [penicillins]    Social History:   reports that he quit smoking about 26 years ago. He quit after 10.00 years of use. He has never used smokeless tobacco. He reports that he drinks alcohol. He reports that he does not use drugs. Family History: family history includes Cancer in his mother. REVIEW OF SYSTEMS:      All 14-point review of systems are completed and  pertinent positives are mentioned in the history of present illness. Other  systems are reviewed and are negative.     PHYSICAL EXAM:      Vital Signs:           Blood pressure 136/72, pulse 62, temperature 97.6 °F (36.4 °C), temperature source Oral, resp. rate 18, height 5' 2\" (1.575 m), weight 134 lb 3.2 oz (60.9 kg), SpO2 95 %.     Admission Weight:  Weight: 138 lb (62.6 kg)      I/O:     Intake/Output Summary (Last 24 hours) at 4/18/2019 1653  Last data filed at 4/18/2019 1632  Gross per 24 hour   Intake 610 ml   Output 0 ml   Net 610 ml           Constitutional and General Appearance: alert, fatigued, cooperative, no distress and appears stated age  HEENT: PERRL, no cervical lymphadenopathy. No masses palpable. Normal oral mucosa  Respiratory:  · Normal excursion and expansion without use of accessory muscles  · Resp Auscultation: Normal breath sounds without dullness or wheezing  Cardiovascular:  · The apical impulse is not displaced  · Heart tones are crisp and normal. regular S1 and S2.  · Jugular venous pulsation Normal  · The carotid upstroke is normal in amplitude and contour without delay or bruit  · Peripheral pulses are symmetrical and full  Abdomen:  · No masses or tenderness  · Bowel sounds present  Extremities:  ·  No Cyanosis or Clubbing  ·  Lower extremity edema: No  · Skin: Warm and dry  Neurological:  · Alert and oriented.   · Moves all extremities well  · No abnormalities of mood, affect, memory, mentation, or behavior are noted    DATA:  reviewed  ECG: Baseline artifactWandering pacemaker with intermittent atrial pacingRight bundle branch blockLeft anterior fascicular block Bifascicular block Abnormal ECGWhen compared with ECG of 28-MAR-2018 21:25,native AV conduction with bifasicular AV block is now expressedConfirmed by Nazario Brown MD, Renate Paige ([id number]) on 4/17/2019 4:30:12 PM  ECHO: Conclusions      Summary   Normal left ventricular systolic function with ejection fraction of 55-60%.   No regional wall motion abnormalites are seen.   Normal left ventricular size with mild concentric left ventricular   hypertrophy.   Grade I diastolic dysfunction with normal filling pressure.   Compared to previous study from 6- no changes noted left ventricular   function.   Mild mitral and aoritc regurgitation.   Mild aortic

## 2019-04-18 NOTE — PROGRESS NOTES
Physical Therapy    Facility/Department: [facility] - MED SURG  Initial Assessment    NAME: Migdalia Mccarthy  : 1924  MRN: [id number]    Date of Service: 2019    Discharge Recommendations:  Subacute/Skilled Nursing Facility   PT Equipment Recommendations  Equipment Needed: No    Assessment   Body structures, Functions, Activity limitations: Decreased balance  Assessment: Pt functioning below baseline following a fall at home. Pt presents with balance deficits needing CGA-Min assist for mobility. Recommend SNF upon DC to improve functional mobility and indep. Treatment Diagnosis: decreased mobility   Prognosis: Good  Decision Making: Low Complexity  Patient Education: role of PT, DC rec. Pt expressed understnading. REQUIRES PT FOLLOW UP: Yes  Activity Tolerance  Activity Tolerance: Patient Tolerated treatment well       Patient Diagnosis(es): The primary encounter diagnosis was Syncope and collapse. Diagnoses of Injury of head, initial encounter, Skin tear of left elbow without complication, initial encounter, and Skin tear of right upper arm without complication, initial encounter were also pertinent to this visit. has a past medical history of Arthritis, BPH (benign prostatic hypertrophy), Cancer ([facility] [address] Dementia, Frequency of urination, Glaucoma, Hypertension, Incontinence, and Unspecified cerebral artery occlusion with cerebral infarction. has a past surgical history that includes Cataract removal with implant (Bilateral); Tonsillectomy; other surgical history (3/21/2013); Pacemaker insertion; hip surgery (Right, 2016); other surgical history (Right, 2017); other surgical history (Right, 08/15/2017); Kidney transplant; and epidural steroid injection (Right, 2019).     Restrictions  Restrictions/Precautions  Restrictions/Precautions: Fall Risk, Up as Tolerated  Vision/Hearing  Vision: Impaired  Vision Exceptions: Wears glasses at all times  Hearing: Exceptions to [address] assistance  Ambulation  Ambulation?: Yes  More Ambulation?: No  Ambulation 1  Surface: level tile  Device: Rolling Walker  Assistance: Contact guard assistance  Quality of Gait: narrow LIZETTE, decreased iqra. Unstead needing CGA for safety   Distance: 100'  Stairs/Curb  Stairs?: No     Balance  Posture: Fair  Sitting - Static: Good  Sitting - Dynamic: Good  Standing - Static: Good  Standing - Dynamic: [address]  Times per week: 3-5x/week   Current Treatment Recommendations: Neuromuscular Re-education, Balance Training  Safety Devices  Type of devices:  All fall risk precautions in place, Call light within reach, Chair alarm in place, Nurse notified, Patient at risk for falls, Left in chair  Restraints  Initially in place: No    G-Code       OutComes Score                                                  AM-PAC Score  AM-PAC Inpatient Mobility Raw Score : 17  AM-PAC Inpatient T-Scale Score : 42.13  Mobility Inpatient CMS 0-100% Score: 50.57  Mobility Inpatient CMS G-Code Modifier : CK          Goals  Short term goals  Time Frame for Short term goals: 4/20  Short term goal 1: Pt will transfer supine <> sit wit Mod I  Short term goal 2: Pt will ambulate x 200' with RW with supervision  Short term goal 3: PT will complete B LE exercises in sitting and standing to improve balance b 4/19  Patient Goals   Patient goals : to go home       Therapy Time   Individual Concurrent Group Co-treatment   Time In 0900         Time Out 0940         Minutes 40         Timed Code Treatment Minutes: [id number] PT

## 2019-04-18 NOTE — PROGRESS NOTES
Bilateral arms with skin tears. Skin tears cleansed with normal saline and redressed. Pt tolerated well. Will monitor.

## 2019-04-18 NOTE — PLAN OF CARE
Problem: Falls - Risk of:  Goal: Will remain free from falls  Description  Will remain free from falls  Outcome: Ongoing  Goal: Absence of physical injury  Description  Absence of physical injury  Outcome: Ongoing     Problem: Pain:  Description  Pain management should include both nonpharmacologic and pharmacologic interventions.   Goal: Pain level will decrease  Description  Pain level will decrease  Outcome: Ongoing

## 2019-04-18 NOTE — PROGRESS NOTES
Occupational Therapy   Occupational Therapy Initial Assessment and Treatment   Date: 2019   Patient Name: Amita Bravo  MRN: [id number]     : 1924    Date of Service: 2019    Discharge Recommendations:  [address]  OT Equipment Recommendations  Equipment Needed: No  Other: defer to next level of care    Assessment   Performance deficits / Impairments: Decreased functional mobility ; Decreased safe awareness;Decreased balance;Decreased ADL status; Decreased high-level IADLs;Decreased ROM; Decreased strength  Assessment: Pt agreeable to therapy. Pt requiring Mod A for sit<> stand transitions using RW from bedside chair and from toilet. Pt requiring Min A/CGA for functional mobility using RW and gait belt. Pt with decreased safety awareness this session, and occasional LOB while standing for ADL task. Pt will benefit from continued skilled OT services to return to functional baseline. Prognosis: Good  Decision Making: High Complexity  Patient Education: role of OT, ADLs, transfers, safety  Barriers to Learning: none  REQUIRES OT FOLLOW UP: Yes  Activity Tolerance  Activity Tolerance: Patient Tolerated treatment well  Activity Tolerance: pt required seated rest breaks between transfers  Safety Devices  Safety Devices in place: Yes  Type of devices: Gait belt;Left in chair;Chair alarm in place;Call light within reach         Patient Diagnosis(es): The primary encounter diagnosis was Syncope and collapse. Diagnoses of Injury of head, initial encounter, Skin tear of left elbow without complication, initial encounter, and Skin tear of right upper arm without complication, initial encounter were also pertinent to this visit. has a past medical history of Arthritis, BPH (benign prostatic hypertrophy), Cancer ([facility] [address] Dementia, Frequency of urination, Glaucoma, Hypertension, Incontinence, and Unspecified cerebral artery occlusion with cerebral infarction.    has a past surgical history that includes Cataract removal with implant (Bilateral); Tonsillectomy; other surgical history (3/21/2013); Pacemaker insertion; hip surgery (Right, 09/06/2016); other surgical history (Right, 02/2017); other surgical history (Right, 08/15/2017); Kidney transplant; and epidural steroid injection (Right, 2/19/2019). Restrictions  Restrictions/Precautions  Restrictions/Precautions: Fall Risk, Up as Tolerated    Subjective   General  Chart Reviewed: Yes  Patient assessed for rehabilitation services?: Yes  Family / Caregiver Present: No  Referring Practitioner: Malu Yee MD  Diagnosis: syncope  Pain Assessment  Pain Assessment: 0-10  Pain Level: 2  Pain Location: Arm  Non-Pharmaceutical Pain Intervention(s): Repositioned; Emotional support; Therapeutic presence;Rest;Relaxation techniques; Ambulation/Increased Activity  Response to Pain Intervention: Patient Satisfied  Oxygen Therapy  SpO2: 97 %  O2 Device: None (Room air)  Social/Functional History  Social/Functional History  Type of Home: Assisted living([facility])  Home Layout: One level  Home Access: Level entry  Bathroom Shower/Tub: Shower chair with back, Walk-in shower  Bathroom Toilet: Standard  Bathroom Equipment: Shower chair, Grab bars in shower, Grab bars around toilet  Home Equipment: (3WW)  Receives Help From: [address]: Independent  Homemaking Responsibilities: No(fixes breakfast, goes to dining room for lunch and dinner; cleaning lady 1x/wk; friend does laundry)  Ambulation Assistance: Independent(3WW)  Active : No  Occupation: Retired  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  Leisure & Hobbies: play bridge, groups/activities at [employer]  Additional Comments: PT 2x/week      Objective   Vision: Impaired  Vision Exceptions: Wears glasses at all times  Hearing: Exceptions to [facility]  Hearing Exceptions: Hard of hearing/hearing concerns    Orientation  Overall Orientation Status: Within Functional Limits     Balance  Sitting Balance: Stand by assistance  Standing Balance: Moderate assistance  Standing Balance  Time: ~1-2 min  Activity: bathroom mobility; standing ADL  Comment: using RW and gait belt; pt reported feeling \"unsteady\" and was leaning posteriorly throughout  Functional Mobility  Functional - Mobility Device: Rolling Walker  Activity: To/from bathroom  Assist Level: Minimal assistance  Functional Mobility Comments: RW and gait belt  Toilet Transfers  Toilet - Technique: Ambulating  Equipment Used: Standard toilet  Toilet Transfer: Moderate assistance  Toilet Transfers Comments: mod sit<>stand from standard toilet using RW; unable to use grab bars to assist  ADL  Grooming: Minimal assistance(standing sinkside to wash hands using RW)  LE Dressing: Supervision(don/doff socks seated in chair)  Toileting: Minimal assistance(able to manage clothing with Min A and complete pericare without assist)  Tone RUE  RUE Tone: Normotonic  Tone LUE  LUE Tone: Normotonic  Coordination  Movements Are Fluid And Coordinated: Yes     Bed mobility  Supine to Sit: Unable to assess  Sit to Supine: Unable to assess  Comment: pt seated in chair at beginning and end of session  Transfers  Sit to stand:  Moderate assistance  Stand to sit: Moderate assistance  Transfer Comments: using gait belt and RW     Cognition  Overall Cognitive Status: WFL     Sensation  Overall Sensation Status: WFL      LUE AROM (degrees)  LUE AROM : WFL  RUE AROM (degrees)  RUE AROM : Exceptions  RUE General AROM: Pt reported R rotate cuff tear; unable to flex shoulder above 90 degrees  LUE Strength  Gross LUE Strength: WFL  LUE Strength Comment: difficulty resisting shoulder flexion, but pt reported this did not limit his function  RUE Strength  Gross RUE Strength: WFL     Plan   Plan  Times per week: 3-5x/wk  Current Treatment Recommendations: Safety Education & Training, Functional Mobility Training, Balance Training, Self-Care / ADL, Patient/Caregiver Education & Training,

## 2019-04-18 NOTE — PROGRESS NOTES
Hospitalist Progress Note      PCP: Martín Griffiths MD    Date of Admission: 4/17/2019    Chief Complaint: syncope    Hospital Course:   80 y.o. male who presented to [facility] with syncope. Patient was walking towards bathroom today and suddenly collapsed. But has no memory of fall and did not have any unusual sensations before falling. Patient has had several falls in the past but these were usually due to tripping or weakness. Patient has a history of SVT and other rhythm abnormalities and has a pacemaker. Patient also has a history of TIAs but he did not notice any weakenss, numbness, or speech changes around the time of the fall. He had no confusion following the syncopal episode. Patient has mild dementia at baseline but is a good historian. He denies fevers, chills, SOB, chest pain, nausea, vomiting, or diarrhea. Subjective: No further syncopal episodes. Awaiting cardiology recs. Medications:  Reviewed    Infusion Medications   Scheduled Medications    diltiazem  240 mg Oral Daily    famotidine  20 mg Oral BID    sodium chloride flush  10 mL Intravenous 2 times per day    enoxaparin  40 mg Subcutaneous Daily     PRN Meds: sodium chloride flush, magnesium hydroxide, ondansetron, acetaminophen      Intake/Output Summary (Last 24 hours) at 4/18/2019 1410  Last data filed at 4/18/2019 1357  Gross per 24 hour   Intake 610 ml   Output 200 ml   Net 410 ml       Physical Exam Performed:    BP (!) 145/73   Pulse 58   Temp 98 °F (36.7 °C) (Oral)   Resp 18   Ht 5' 2\" (1.575 m)   Wt 134 lb 3.2 oz (60.9 kg)   SpO2 97%   BMI 24.55 kg/m²       General appearance:  No apparent distress, appears stated age and cooperative. HEENT:  Normal cephalic, atraumatic without obvious deformity. Pupils equal, round, and reactive to light. Extra ocular muscles intact. Conjunctivae/corneas clear. Neck: Supple, with full range of motion. No jugular venous distention.  Trachea

## 2019-04-18 NOTE — DISCHARGE INSTR - COC
Continuity of Care Form    Patient Name: Woody Leary   :  1924  MRN:  [id number]    Admit date:  2019  Discharge date: 19    Code Status Order: Full Code   Advance Directives:   Advance Care Flowsheet Documentation     Date/Time Healthcare Directive Type of Healthcare Directive Copy in [address] Agent's Name Healthcare Agent's Phone Number    19 1543  Yes, patient has an advance directive for healthcare treatment  Living will  No, copy requested from family  [facility] power of   SurekhaDavid Ferrell   [phone] cell          Admitting Physician:  Ana Asencio MD  PCP: Kahlil Martínez MD    Discharging Nurse: Gayle Mendenhallbob Unit/Room#: [facility] Phone Number: [phone]    Emergency Contact:   Extended Emergency Contact Information  Primary Emergency Contact: Jaya Kessler [address] Phone: [phone]  Mobile Phone: [phone]  Relation: Child    Past Surgical History:  Past Surgical History:   Procedure Laterality Date    CATARACT REMOVAL WITH IMPLANT Bilateral     EPIDURAL STEROID INJECTION Right 2019    RIGHT INTRA-ARTICULAR RIGHT SACROILIAC INJECTION SITE CONFIRMED BY FLUOROSCOPY performed by Delmy Cummings MD at [address] [facility] Right 2016    arthrogram and cortisone injection    KIDNEY TRANSPLANT      OTHER SURGICAL HISTORY  3/21/2013    CYSTOSCOPY,VISUAL INTERNAL URETHROTOMY, TRANSURETHRAL    OTHER SURGICAL HISTORY Right 2017    hip injection    OTHER SURGICAL HISTORY Right 08/15/2017    arthrogram/cortisone injection rt hip    PACEMAKER INSERTION      TONSILLECTOMY         Immunization History:   Immunization History   Administered Date(s) Administered    Influenza Virus Vaccine 2014       Active Problems:  Patient Active Problem List   Diagnosis Code    Benign prostatic hyperplasia with urinary diagnosis listed and that he requires [place] for less 30 days.      Update Admission H&P: No change in H&P    PHYSICIAN SIGNATURE:  Electronically signed by Titi Turpin MD on 4/19/19 at 4:07 PM

## 2019-04-18 NOTE — CARE COORDINATION
CASE MANAGEMENT INITIAL ASSESSMENT      Reviewed chart and met with patient today, re: SNF recs  Explained Case Management role/services. Family present: none  Primary contact information: DaughterNina [phone], Cell [phone]    Admit date/status: 04/17/2019  Diagnosis: Syncope and collapse    Insurance: medicare with [facility] secondary  Precert required for SNF - N        3 night stay required - Y    Living arrangements, Adls, care needs, prior to admission: lives in the [address] [place] [address]. Ambulatory- uses walker. [facility]: [facility], and daughter transports at times    [address] [place] [address] at home: Walker_X_Cane_X_RTS__ BSC__Shower Chair_X_  02__ HHN__ CPAP__  BiPap__  Hospital Bed__ W/C___ Other grab bars in shower    Services in the home and/or outpatient, prior to admission[facility]    PT/OT recs: Pt recs SNF. OT pending     Hospital Exemption Notification (HEN): will be needed for SNF placement     Barriers to discharge: none identified     Plan/comments: Pt is agreeable to SNF if needed. He is reviewing medicare list at this time and will require 3 midnights to be eligible per medicare criteria. Pt is also hopeful that he will progress and may be able to go back home and resume [place] [address] services. Will follow.      ECOC on chart for MD signature

## 2019-04-19 NOTE — PROGRESS NOTES
Spoke with Norma Abebe at the [place] [facility] to let her know md does not want patient on exelon patch until he has follow up with his pcp next week.

## 2019-04-19 NOTE — PROGRESS NOTES
Received message back from md that exelon was discontinued because it can cause syncope and to follow up with pcp next week regarding this medication.

## 2019-04-19 NOTE — PROGRESS NOTES
Report called to Silverio Kim at the [facility], transport here to  patient, patient alert/oriented, has all his belongings including his 3 wheeled walker and discharge instructions given to transportation staff.

## 2019-04-19 NOTE — PROGRESS NOTES
Physical Therapy  Facility/Department: [facility] B3 - MED SURG  Daily Treatment Note  NAME: Fariba Landeros  : 1924  MRN: [id number]    Date of Service: 2019    Discharge Recommendations:  Subacute/Skilled Nursing Facility   PT Equipment Recommendations  Equipment Needed: No    Patient Diagnosis(es): The primary encounter diagnosis was Syncope and collapse. Diagnoses of Injury of head, initial encounter, Skin tear of left elbow without complication, initial encounter, and Skin tear of right upper arm without complication, initial encounter were also pertinent to this visit. has a past medical history of Arthritis, BPH (benign prostatic hypertrophy), Cancer ([facility] [address] Dementia, Frequency of urination, Glaucoma, Hypertension, Incontinence, and Unspecified cerebral artery occlusion with cerebral infarction. has a past surgical history that includes Cataract removal with implant (Bilateral); Tonsillectomy; other surgical history (3/21/2013); Pacemaker insertion; hip surgery (Right, 2016); other surgical history (Right, 2017); other surgical history (Right, 08/15/2017); Kidney transplant; and epidural steroid injection (Right, 2019). Restrictions  Restrictions/Precautions  Restrictions/Precautions: Fall Risk, Up as Tolerated  Subjective   General  Chart Reviewed: Yes  Response To Previous Treatment: Patient with no complaints from previous session. Family / Caregiver Present: Yes(NAHID)  Referring Practitioner: Kayleigh Arreguin MD  Subjective  Subjective: Pt supine in bed at approach. Reports agreeable to PT. General Comment  Comments: cleared by nursing  Pain Screening  Patient Currently in Pain: Denies  Vital Signs  Patient Currently in Pain: Denies       Orientation  Orientation  Overall Orientation Status: Within Normal Limits  Cognition      Objective   Bed mobility  Rolling to Left: Modified independent  Rolling to Right: Modified independent  Supine to Sit: Unable to assess; Modified independent  Transfers  Sit to Stand:  min A (became sba w/ practice, cues hand placement & rollator brakes)  Stand to sit: Stand by assistance  Ambulation  Ambulation?: Yes  Ambulation 1  Surface: level tile  Device: Rollator  Assistance: Contact guard assistance  Quality of Gait: narrow LIZETTE, decreased iqra. Unstead turns needing CGA for safety   Distance: 100 ft, 15 ft, 30 ft     Balance  Posture: Fair  Sitting - Static: Good  Sitting - Dynamic: Good  Standing - Static: Good  Standing - Dynamic: Fair   Comment: toilet transfers: cga: verbal and tactile cues for hand placement      Assessment   Body structures, Functions, Activity limitations: Decreased balance  Assessment: Pt functioning below baseline following a fall at home. Pt presents with balance deficits needing CGA assist for mobility and grossly Min A for STS transfers. Recommend SNF upon DC to improve functional mobility and indep. Treatment Diagnosis: decreased mobility   Prognosis: Good  Patient Education: role of PT, DC rec. Pt expressed understanding. REQUIRES PT FOLLOW UP: Yes  Activity Tolerance  Activity Tolerance: Patient Tolerated treatment well     AM-PAC Score  AM-PAC Inpatient Mobility Raw Score : 17  AM-PAC Inpatient T-Scale Score : 42.13  Mobility Inpatient CMS 0-100% Score: 50.57  Mobility Inpatient CMS G-Code Modifier : CK          Goals  Short term goals  Time Frame for Short term goals: 4/20  Short term goal 1: Pt will transfer supine <> sit wit Mod I  Short term goal 2: Pt will ambulate x 200' with RW with supervision  Short term goal 3: PT will complete B LE exercises in sitting and standing to improve balance b 4/19  Patient Goals   Patient goals : to go home    Plan    Plan  Times per week: 3-5x/week   Current Treatment Recommendations: Neuromuscular Re-education, Balance Training  Safety Devices  Type of devices:  All fall risk precautions in place, Call light within reach, Chair alarm in place, Nurse notified, Patient at risk for falls, Left in chair  Restraints  Initially in place: No     Therapy Time   Individual Concurrent Group Co-treatment   Time In 1105         Time Out 1143         Minutes 38         Timed Code Treatment Minutes: [address]

## 2019-04-19 NOTE — CARE COORDINATION
ARU states that the Pt does not meet medical criteria for admission. Declining at this time. Awaiting call back from [facility] for OOP cost and then will speak with Pt again. Addendum 1600pm: Met with the Pt at the bedside. After reviewing choices and OOP expense the Pt chose [facility]. Daughter called on the phone and questions answered and she is in agreement. [facility] transport arranged for 1830  time. LATOYA Ivan aware. Pt and daughter aware.

## 2019-04-19 NOTE — PROGRESS NOTES
Occupational Therapy  Facility/Department: [facility] B3 - MED SURG  Daily Treatment Note  NAME: Sabine Chiu  : 1924  MRN: [id number]    Date of Service: 2019    Discharge Recommendations:  Subacute/Skilled Nursing Facility  OT Equipment Recommendations  Equipment Needed: No  Other: defer to next level of care    Assessment   Performance deficits / Impairments: Decreased functional mobility ; Decreased safe awareness;Decreased balance;Decreased ADL status; Decreased high-level IADLs;Decreased ROM; Decreased strength  Assessment: Pt agreeable to therapy. Pt required min A for sit<>stand transitions using 3WW. Pt leaned posteriorly with each stand requiring assistance to safely stand. Pt unaware of posterior lean during all transfers. Pt educated on discharge planning and reported that he feels \"unsafe without help. \" Pt will benefit from SNF upon discharge for safety with functional transfers/ mobility. Continue skilled OT per POC. Barriers to home discharge:   [] Steps to access home entry or bed/bath:   [] No rail with steps to access home entry or bed/bath   [x] Reported available assist at home upon discharge limited   [] Patient or family requests DC to other than home    [] Patient reports expectation of post acute Discharge disposition to other than home   [] Other:     Prognosis: Good  Decision Making: High Complexity  Patient Education: role of OT, ADLs, transfers, safety, discharge planning  REQUIRES OT FOLLOW UP: Yes  Activity Tolerance  Activity Tolerance: Patient Tolerated treatment well  Activity Tolerance: pt required seated rest breaks between transfers  Safety Devices  Safety Devices in place: Yes  Type of devices: Gait belt;Left in chair;Chair alarm in place;Call light within reach       Patient Diagnosis(es): The primary encounter diagnosis was Syncope and collapse.  Diagnoses of Injury of head, initial encounter, Skin tear of left elbow without complication, initial encounter, and Skin get up with CGA     Cognition  Overall Cognitive Status: [facility]      Plan   Plan  Times per week: 3-5x/wk  Current Treatment Recommendations: Safety Education & Training, Functional Mobility Training, Balance Training, Self-Care / ADL, Patient/Caregiver Education & Training, Strengthening, Endurance Training    AM-PAC Score     AM-PAC Inpatient Daily Activity Raw Score: 18  AM-PAC Inpatient ADL T-Scale Score : 38.66  ADL Inpatient CMS 0-100% Score: 46.65  ADL Inpatient CMS G-Code Modifier : CK    Goals  Short term goals  Time Frame for Short term goals: within one week (4/25/19)  Short term goal 1: Pt will complete toilet transfer (CGA)  Short term goal 2: Pt will complete standing ADL task (CGA)  Short term goal 3: Pt will complete BUE AROM ther ex without cues by 4/21  Patient Goals   Patient goals : \"get better and go home\"     Therapy Time   Individual Concurrent Group Co-treatment   Time In 7338         Time Out 1225         Minutes 38         Timed Code Treatment Minutes: Sage Deng [address], OTR/L

## 2019-04-19 NOTE — CARE COORDINATION
RN JUNE spoke to PT/OT who are still recommending SNF placement. Met with the Pt and son in law at the bedside  regarding possible ARU admission. The Pt is agreeable to go to the ARU, and actually prefers this over alternative options. He participates willingly with therapy and is doing well. The Pt seems to lack some confidence in his abilities for fear of falling. Referral to [facility], [address] [place] [address]. Awaiting response at this time. Referrals to [facility] and [facility] to run benefits for the Pt for OOP cost if ARU declines. Will follow.

## 2019-04-19 NOTE — PROGRESS NOTES
Hospitalist Progress Note      PCP: Charmayne Guppy, MD    Date of Admission: 4/17/2019    Chief Complaint: syncope    Hospital Course:   80 y.o. male who presented to [facility] with syncope. Patient was walking towards bathroom today and suddenly collapsed. But has no memory of fall and did not have any unusual sensations before falling. Patient has had several falls in the past but these were usually due to tripping or weakness. Patient has a history of SVT and other rhythm abnormalities and has a pacemaker. Patient also has a history of TIAs but he did not notice any weakenss, numbness, or speech changes around the time of the fall. He had no confusion following the syncopal episode. Patient has mild dementia at baseline but is a good historian. He denies fevers, chills, SOB, chest pain, nausea, vomiting, or diarrhea. Subjective: No further syncopal episodes. Awaiting cardiology recs. Medications:  Reviewed    Infusion Medications   Scheduled Medications    memantine  10 mg Oral BID    latanoprost  1 drop Both Eyes Nightly    timolol  1 drop Both Eyes Daily    diltiazem  240 mg Oral Daily    famotidine  20 mg Oral BID    sodium chloride flush  10 mL Intravenous 2 times per day    enoxaparin  40 mg Subcutaneous Daily     PRN Meds: melatonin, sodium chloride flush, magnesium hydroxide, ondansetron, acetaminophen      Intake/Output Summary (Last 24 hours) at 4/19/2019 0836  Last data filed at 4/19/2019 1861  Gross per 24 hour   Intake 780 ml   Output 0 ml   Net 780 ml       Physical Exam Performed:    BP (!) 157/76   Pulse 64   Temp 97.8 °F (36.6 °C) (Oral)   Resp 16   Ht 5' 2\" (1.575 m)   Wt 134 lb 3.2 oz (60.9 kg)   SpO2 97%   BMI 24.55 kg/m²       General appearance:  No apparent distress, appears stated age and cooperative. HEENT:  Normal cephalic, atraumatic without obvious deformity. Pupils equal, round, and reactive to light. Extra ocular muscles intact. Conjunctivae/corneas clear. Neck: Supple, with full range of motion. No jugular venous distention. Trachea midline. Respiratory:  Normal respiratory effort. Clear to auscultation, bilaterally without Rales/Wheezes/Rhonchi. Cardiovascular:  Regular rate and rhythm with normal S1/S2 without murmurs, rubs or gallops. Abdomen: Soft, non-tender, non-distended with normal bowel sounds. Musculoskeletal:  No clubbing, cyanosis or edema bilaterally. Full range of motion without deformity. Skin: Skin color, texture, turgor normal.  Bruising on BLE and occiput without any wounds. Neurologic:  Neurovascularly intact without any focal sensory/motor deficits. Cranial nerves: II-XII intact, grossly non-focal.  Psychiatric:  Alert and oriented, thought content appropriate, normal insight  Capillary Refill: Brisk,< 3 seconds   Peripheral Pulses: +2 palpable, equal bilaterally       Labs:   Recent Labs     04/17/19  1159 04/18/19  0627   WBC 7.1 7.0   HGB 13.3* 12.7*   HCT 37.8* 37.7*    205     Recent Labs     04/17/19  1159 04/18/19  0627    143   K 4.1 3.9    105   CO2 28 27   BUN 23* 21*   CREATININE 0.7* 0.7*   CALCIUM 8.9 8.6     Recent Labs     04/17/19  1159   AST 16   ALT 10   BILITOT 0.5   ALKPHOS 61     Recent Labs     04/17/19  1159   INR 0.96     Recent Labs     04/17/19  1159   TROPONINI <0.01       Urinalysis:      Lab Results   Component Value Date    NITRU Negative 04/17/2019    WBCUA 20-50 04/17/2019    BACTERIA 3+ 04/17/2019    RBCUA 0-2 04/17/2019    BLOODU Negative 04/17/2019    SPECGRAV 1.010 04/17/2019    GLUCOSEU Negative 04/17/2019       Radiology:  CT CERVICAL SPINE WO CONTRAST   Final Result   No evidence of acute fracture. CT HEAD WO CONTRAST   Final Result   1. No acute intracranial abnormality. XR CHEST PORTABLE   Preliminary Result   Mild left basilar atelectasis. Otherwise, no evidence of acute   cardiopulmonary disease.

## 2019-04-19 NOTE — CARE COORDINATION
RN CM made aware this am that the Pt is listed as observation status and therefore cannot meet the medicare three midnight criteria. Since he doesn't have a recent qualifying stay, he will have to pay OOP for SNF placement or return home with home care. Perfect serve sent to DM to notify. LATOYA WATKINS will meet with the Pt to update and confirm discharge plan.

## 2019-04-19 NOTE — PROGRESS NOTES
Patient alert/oriented, vss, denies pain, instructed to use call light for assistance in getting up to bathroom, call light within reach

## 2019-04-19 NOTE — PLAN OF CARE
Bed alarm on & in place. 2/4 side rails up. Pt wearing non skid footwear. Bed locked & in lowest position. Call light within reach.

## 2019-04-19 NOTE — DISCHARGE SUMMARY
Hospital Medicine Discharge Summary    Patient ID: Nena Villalba      Patient's PCP: Josh Bloom MD    Admit Date: 4/17/2019     Discharge Date:   04/19/19    Admitting Physician: Claudell Garner, MD     Discharge Physician: Claudell Garner, MD     Discharge Diagnoses: Active Hospital Problems    Diagnosis Date Noted    Dementia [F03.90] 04/17/2019    Syncope and collapse [R55] 04/17/2019    Essential hypertension [I10]        The patient was seen and examined on day of discharge and this discharge summary is in conjunction with any daily progress note from day of discharge. Hospital Course:   80 y. o. male who presented to [facility] with syncope. Patient was walking towards bathroom today and suddenly collapsed. But has no memory of fall and did not have any unusual sensations before falling. Patient has had several falls in the past but these were usually due to tripping or weakness. Patient has a history of SVT and other rhythm abnormalities and has a pacemaker. Patient also has a history of TIAs but he did not notice any weakenss, numbness, or speech changes around the time of the fall. He had no confusion following the syncopal episode. Patient has mild dementia at baseline but is a good historian. He denies fevers, chills, SOB, chest pain, nausea, vomiting, or diarrhea. Syncope and fall  - unclear etiology, possibly related to rivastigmine  - CT head negative  - Echo stable  - tele  - orthostatics negative, restarted home cardizem  - cardiology consulted given SVT history.  No arrhthymias noted on pacer interrogation     HTN  - orthostatics negative  - restated home cardizem     Dementia  - restarted namenda, continuing to hold rivastigmine on discharge    Physical Exam Performed:     BP (!) 142/62   Pulse 58   Temp 97.6 °F (36.4 °C) (Oral)   Resp 18   Ht 5' 2\" (1.575 m)   Wt 134 lb 3.2 oz (60.9 kg)   SpO2 95%   BMI 24.55 kg/m²       General appearance:  No apparent CONSULT TO CARDIOLOGY    Disposition:  SNF     Condition at Discharge: Stable    Discharge Instructions/Follow-up:  Follow up with PCP within 1-2 weeks    Code Status:  Full Code     Activity: activity as tolerated    Diet: regular diet      Discharge Medications:     Current Discharge Medication List           Details   melatonin 5 MG TABS tablet Take 1 tablet by mouth nightly as needed (insomnia)  Qty: 30 tablet, Refills: 0              Details   Fesoterodine Fumarate ER (TOVIAZ) 8 MG TB24 Take by mouth      !! diclofenac sodium 1 % GEL APPLY 4 GM TO RIGHT KNEE THREE TIMES DAILY AS NEEDED FOR PAIN  Qty: 100 g, Refills: 0      diltiazem (CARDIZEM CD) 240 MG extended release capsule Take 1 capsule by mouth daily  Qty: 90 capsule, Refills: 3    Comments: **Patient requests 90 days supply**      timolol (TIMOPTIC) 0.5 % ophthalmic solution Place 1 drop into both eyes daily   Refills: 2      latanoprost (XALATAN) 0.005 % ophthalmic solution Place 1 drop into both eyes nightly. memantine (NAMENDA XR) 28 MG CP24 Take 28 mg by mouth daily. Calcium Carbonate-Vitamin D (CALCIUM + D) 600-200 MG-UNIT TABS Take 1 tablet by mouth daily. !! diclofenac sodium 1 % GEL Apply 4 g topically 4 times daily  Qty: 4 Tube, Refills: 1      famotidine (PEPCID) 20 MG tablet Take 1 tablet by mouth 2 times daily  Qty: 15 tablet, Refills: 0       !! - Potential duplicate medications found. Please discuss with provider. Time Spent on discharge is more than 30 minutes in the examination, evaluation, counseling and review of medications and discharge plan. Signed:    Taran Rosales MD   4/19/2019      Thank you Cecelia Greene MD for the opportunity to be involved in this patient's care. If you have any questions or concerns please feel free to contact me at [phone].

## 2019-04-22 NOTE — TELEPHONE ENCOUNTER
Priti Ortiz (daughter)  called to cancel injection on 04/30/2019    Injection cancelled    [facility]

## 2019-04-22 NOTE — H&P
HISTORY AND PHYSICAL/PRE-SEDATION ASSESSMENT    Patient:  Violetta Dunn   :  1924  Medical Record No.:  [id number]   Date:  19  Physician:  Vidal Aragon M.D. Facility: [facility]     Nursing History and Physical reviewed and agreed upon. Additional findings:    Allergies:  Pcn [penicillins]    Home Medications:    Prior to Admission medications    Medication Sig Start Date End Date Taking? Authorizing Provider   melatonin 5 MG TABS tablet Take 1 tablet by mouth nightly as needed (insomnia) 19   Asa Garcia MD   Fesoterodine Fumarate ER (TOVIAZ) 8 MG TB24 Take by mouth    Historical Provider, MD   diclofenac sodium 1 % GEL Apply 4 g topically 4 times daily 19   SONIA Ward   diclofenac sodium 1 % GEL APPLY 4 GM TO RIGHT KNEE THREE TIMES DAILY AS NEEDED FOR PAIN 18   Dennis Evangelista MD   famotidine (PEPCID) 20 MG tablet Take 1 tablet by mouth 2 times daily 18   Danell Litten, MD   diltiazem (CARDIZEM CD) 240 MG extended release capsule Take 1 capsule by mouth daily 18   JUSTUS White CNP   timolol (TIMOPTIC) 0.5 % ophthalmic solution Place 1 drop into both eyes daily  16   Historical Provider, MD   latanoprost (XALATAN) 0.005 % ophthalmic solution Place 1 drop into both eyes nightly. Historical Provider, MD   memantine (NAMENDA XR) 28 MG CP24 Take 28 mg by mouth daily. Historical Provider, MD   Calcium Carbonate-Vitamin D (CALCIUM + D) 600-200 MG-UNIT TABS Take 1 tablet by mouth daily. Historical Provider, MD       Vitals: Stable     PHYSICAL EXAM:  HENT: Airway patent and reviewed  Cardiovascular: Normal rate, regular rhythm, normal heart sounds. Pulmonary/Chest: No wheezes. No rhonchi. No rales. Abdominal: Soft. Bowel sounds are normal. No distension. MALLAMPATI:           []   I. Complete visualization of the soft palate           [x]   II.   Complete visualization of the uvula []   III. Visualization of only the base of the uvula           []   IV. Soft palate is not visible     ASA CLASS:         []   I. Normal, healthy adult           [x]   II.  Mild systemic disease            []   III. Severe systemic disease      Sedation plan:   [x]  Local              []  Minimal                  []  General anesthesia    Patient's condition acceptable for planned procedure/sedation. Post Procedure Plan   Return to same level of care   ______________________     The risks and benefits as well as alternatives to the procedure have been discussed with the patient and or family. The patient and or next of kin understands and agrees to proceed.     Herman Bryan M.D.

## 2019-04-24 NOTE — OP NOTE
Patient:  Patricia Murdock   Medical Record #:  [id number]   Date:  4-23-19  Physician:  Wilian Brady M.D. Facility: [facility]       Pre-op diagnosis: Lumbar radiculitis, lumbar spondylosis  Post-op diagnosis:  same  Procedure:  Right L4 5 transforaminal epidural injection #2 with flouroscopic guidance     Procedure Note:    The patient was admitted through pre-op and written consent was obtained. The patient was advised of the risks and benefits of the procedure, including but not limited to the following: bleeding, pain, infection, temporary paralysis, nerve damage and spinal headache. The patient was given the opportunity to ask questions. There were no contraindications for this procedure. The appropriate area was prepped and draped in a sterile fashion. Landmarks were identified and marked. A 23G spinal needle was advanced to the right L4 neural foramen using fluoroscopic guidance with ideal needle tip placement confirmed by multiple views. Injection of contrast showed epidural flow. There were no signs of intravascular or intrathecal injection. 80 mg depomedrol and 1cc 1% lidocaine were then injected. There were no complications and the patient tolerated the procedure well. The patient was transferred to the recovery area and monitored. Discharge instructions were given. The patient is to contact me for any post-procedure concerns. The patient is to follow up as scheduled.     Wliian Brady MD

## 2019-05-09 NOTE — PROGRESS NOTES
CHIEF COMPLAINT:    Chief Complaint   Patient presents with    Hip Pain     LEFT HIP. PT STATES PAIN FOR A FEW DAYS GROIN       HISTORY OF PRESENT ILLNESS:                The patient is a 80 y.o. male this patient has known degenerative changes in his low back as well as his RIGHT hip. We have seen him in the past for intra-articular and trochanteric injections of his RIGHT hip. He most recently had an epidural steroid injection with Dr. Jalen Reeves on 4/23/2019. About 3 or 4 weeks ago he had a fall and had been at the [facility] for rehab. He just got back to the [facility]. He had therapy on Monday and didn't remember any injury. Then last night and this morning he started to have acute pain through the anterior LEFT hip when he tried to stand up. Once he is up, he can weight-bear on the LEFT lower extremity but it is uncomfortable. He points to the groin as the source of his pain.   Past Medical History:   Diagnosis Date    Arthritis     BPH (benign prostatic hypertrophy)     Cancer (HCC)     prostate    Dementia     Frequency of urination     Glaucoma     Hypertension     Incontinence     Unspecified cerebral artery occlusion with cerebral infarction     TIA in 2011            The pain assessment was noted & is as follows:  Pain Assessment  Location of Pain: Pelvis  Location Modifiers: Left  Severity of Pain: 4  Quality of Pain: Dull, Aching, Sharp  Duration of Pain: A few hours  Frequency of Pain: Several times daily  Aggravating Factors: Bending, Stretching, Standing, Walking  Limiting Behavior: Some  Relieving Factors: Rest  Result of Injury: No  Work-Related Injury: No  Are there other pain locations you wish to document?: No]      Work Status/Functionality:     Past Medical History: Medical history form was reviewed today & can be found in the media tab  Past Medical History:   Diagnosis Date    Arthritis     BPH (benign prostatic hypertrophy)     Cancer (HCC)     prostate    Dementia     Frequency of urination     Glaucoma     Hypertension     Incontinence     Unspecified cerebral artery occlusion with cerebral infarction     TIA in 2011      Past Surgical History:     Past Surgical History:   Procedure Laterality Date    CATARACT REMOVAL WITH IMPLANT Bilateral     EPIDURAL STEROID INJECTION Right 2/19/2019    RIGHT INTRA-ARTICULAR RIGHT SACROILIAC INJECTION SITE CONFIRMED BY FLUOROSCOPY performed by Terry Hernandez MD at [facility] Right 4/23/2019    RIGHT LUMBAR FOUR FIVE EPIDURAL STEROID INJECTION SITE CONFIRMED BY FLUOROSCOPY performed by Terry Hernandez MD at [id number] [place] [address] Right 09/06/2016    arthrogram and cortisone injection    KIDNEY TRANSPLANT      OTHER SURGICAL HISTORY  3/21/2013    CYSTOSCOPY,VISUAL INTERNAL URETHROTOMY, TRANSURETHRAL    OTHER SURGICAL HISTORY Right 02/2017    hip injection    OTHER SURGICAL HISTORY Right 08/15/2017    arthrogram/cortisone injection rt hip    PACEMAKER INSERTION      TONSILLECTOMY       Current Medications:     Current Outpatient Medications:     melatonin 5 MG TABS tablet, Take 1 tablet by mouth nightly as needed (insomnia), Disp: 30 tablet, Rfl: 0    Fesoterodine Fumarate ER (TOVIAZ) 8 MG TB24, Take by mouth, Disp: , Rfl:     diclofenac sodium 1 % GEL, Apply 4 g topically 4 times daily, Disp: 4 Tube, Rfl: 1    diclofenac sodium 1 % GEL, APPLY 4 GM TO RIGHT KNEE THREE TIMES DAILY AS NEEDED FOR PAIN, Disp: 100 g, Rfl: 0    famotidine (PEPCID) 20 MG tablet, Take 1 tablet by mouth 2 times daily, Disp: 15 tablet, Rfl: 0    diltiazem (CARDIZEM CD) 240 MG extended release capsule, Take 1 capsule by mouth daily, Disp: 90 capsule, Rfl: 3    timolol (TIMOPTIC) 0.5 % ophthalmic solution, Place 1 drop into both eyes daily , Disp: , Rfl: 2    latanoprost (XALATAN) 0.005 % ophthalmic solution, Place 1 drop into both eyes nightly., Disp: , Rfl:     memantine (NAMENDA XR) 28 MG CP24, Take 28 mg by mouth daily. , Disp: , Rfl:     Calcium Carbonate-Vitamin D (CALCIUM + D) 600-200 MG-UNIT TABS, Take 1 tablet by mouth daily. , Disp: , Rfl:   Allergies:  Pcn [penicillins]  Social History:    reports that he quit smoking about 26 years ago. He quit after 10.00 years of use. He has never used smokeless tobacco. He reports that he drinks alcohol. He reports that he does not use drugs. Family History:   Family History   Problem Relation Age of Onset    Cancer Mother        REVIEW OF SYSTEMS:   For new problems, a full review of systems will be found scanned in the patient's chart. CONSTITUTIONAL: Denies unexplained weight loss, fevers, chills   NEUROLOGICAL: Denies unsteady gait or progressive weakness  SKIN: Denies skin changes, delayed healing, rash, itching       PHYSICAL EXAM:    Vitals: Height 5' 2.01\" (1.575 m), weight 130 lb 1.1 oz (59 kg). GENERAL EXAM:  · General Apparence: Patient is adequately groomed with no evidence of malnutrition. · Orientation: The patient is oriented to time, place and person. · Mood & Affect:The patient's mood and affect are appropriate       Left hip  PHYSICAL EXAMINATION:  · Inspection:  No deformity ecchymosis or erythema of the LEFT hip    · Palpation:  Mild tenderness through the groin      · Range of Motion: Limitations with internal and external rotation of the LEFT hip secondary to stiffness but no significant groin pain    · Strength: Hip flexor and abductor and adductor are intact    · Special Tests: The patient has moderate to severe pain through the groin when he attempts to stand from a seated position. · Skin:  There are no rashes, ulcerations or lesions. · There are no distal  dysvascular changes     Gait & station: The patient is in a wheelchair today      Additional Examinations:              Diagnostic Testing:   The following x rays were read and interpreted by myself      1.  2 views of the LEFT hip obtained today The patient has advanced bilateral hip osteoarthritis, RIGHT greater than LEFT, no definitive acute abnormality of the LEFT hip    Orders     Orders Placed This Encounter   Procedures    XR HIP LEFT (2-3 VIEWS)     Standing Status:   Future     Number of Occurrences:   1     Standing Expiration Date:   6/9/2019    CT HIP LEFT WO CONTRAST     Standing Status:   Future     Standing Expiration Date:   5/9/2020     Scheduling Instructions:      [facility]     Order Specific Question:   Reason for exam:     Answer:   LEFT HIP PAIN. R.O FX         Assessment / Treatment Plan:     1. LEFT hip significant osteoarthritis, possible insufficiency fracture    Most likely, I believe this patient's pain is likely from his recent physical therapy. However, given the severity of his pain with weightbearing activity I cannot rule out insufficiency fracture. I recommended a stat CT scan of the LEFT hip to rule out fracture. We will send the patient over for CT scan as soon as possible and continue protected weightbearing. Controlled Substances Monitoring:     RX Monitoring 5/9/2019   Attestation The Prescription Monitoring Report for this patient was reviewed today.    Chronic Pain Routine Monitoring No signs of potential drug abuse or diversion identified: otherwise, see note documentation

## 2019-05-13 NOTE — PROGRESS NOTES
Status/Functionality: retired    Past Medical History: Medical history form was reviewed today & scanned into the media tab  Past Medical History:   Diagnosis Date    Arthritis     BPH (benign prostatic hypertrophy)     Cancer (HCC)     prostate    Dementia     Frequency of urination     Glaucoma     Hypertension     Incontinence     Unspecified cerebral artery occlusion with cerebral infarction     TIA in 2011      Past Surgical History:     Past Surgical History:   Procedure Laterality Date    CATARACT REMOVAL WITH IMPLANT Bilateral     EPIDURAL STEROID INJECTION Right 2/19/2019    RIGHT INTRA-ARTICULAR RIGHT SACROILIAC INJECTION SITE CONFIRMED BY FLUOROSCOPY [facility] Right 4/23/2019    RIGHT LUMBAR FOUR FIVE EPIDURAL STEROID INJECTION SITE CONFIRMED BY FLUOROSCOPY performed by Dorys Nelson MD at [id number] [place] [address] Right 09/06/2016    arthrogram and cortisone injection    KIDNEY TRANSPLANT      OTHER SURGICAL HISTORY  3/21/2013    CYSTOSCOPY,VISUAL INTERNAL URETHROTOMY, TRANSURETHRAL    OTHER SURGICAL HISTORY Right 02/2017    hip injection    OTHER SURGICAL HISTORY Right 08/15/2017    arthrogram/cortisone injection rt hip    PACEMAKER INSERTION      TONSILLECTOMY       Current Medications:     Current Outpatient Medications:     traMADol (ULTRAM) 50 MG tablet, Take 1 tablet by mouth every 6 hours as needed for Pain for up to 7 days. Intended supply: 3 days.  Take lowest dose possible to manage pain, Disp: 28 tablet, Rfl: 0    melatonin 5 MG TABS tablet, Take 1 tablet by mouth nightly as needed (insomnia), Disp: 30 tablet, Rfl: 0    Fesoterodine Fumarate ER (TOVIAZ) 8 MG TB24, Take by mouth, Disp: , Rfl:     diclofenac sodium 1 % GEL, Apply 4 g topically 4 times daily, Disp: 4 Tube, Rfl: 1    diclofenac sodium 1 % GEL, APPLY 4 GM TO RIGHT KNEE THREE TIMES DAILY AS NEEDED FOR PAIN, Disp: 100 g, Rfl: 0    famotidine (PEPCID) 20 MG tablet, Take 1 tablet by mouth 2 times daily, Disp: 15 tablet, Rfl: 0    diltiazem (CARDIZEM CD) 240 MG extended release capsule, Take 1 capsule by mouth daily, Disp: 90 capsule, Rfl: 3    timolol (TIMOPTIC) 0.5 % ophthalmic solution, Place 1 drop into both eyes daily , Disp: , Rfl: 2    latanoprost (XALATAN) 0.005 % ophthalmic solution, Place 1 drop into both eyes nightly., Disp: , Rfl:     memantine (NAMENDA XR) 28 MG CP24, Take 28 mg by mouth daily. , Disp: , Rfl:     Calcium Carbonate-Vitamin D (CALCIUM + D) 600-200 MG-UNIT TABS, Take 1 tablet by mouth daily. , Disp: , Rfl:   Allergies:  Pcn [penicillins]  Social History:    reports that he quit smoking about 26 years ago. He quit after 10.00 years of use. He has never used smokeless tobacco. He reports that he drinks alcohol. He reports that he does not use drugs. Family History:   Family History   Problem Relation Age of Onset    Cancer Mother        REVIEW OF SYSTEMS: Full ROS noted & scanned   CONSTITUTIONAL: Denies unexplained weight loss, fevers, chills or fatigue  NEUROLOGICAL: Denies unsteady gait or progressive weakness  PHYSICAL EXAM:    Vitals: Blood pressure 128/80, pulse 80, height 5' 2.01\" (1.575 m), weight 130 lb 1.1 oz (59 kg). GENERAL EXAM:  · General Apparence: Patient is adequately groomed with no evidence of malnutrition. · Orientation: The patient is oriented to time, place and person. · Mood & Affect:The patient's mood and affect are appropriate   · Sensation: Sensation is intact without deficit    LUMBAR/SACRAL EXAMINATION:  · Inspection:Is kyphotic and stands with a walker   No evidence of tenderness at the midline. No tenderness bilaterally at the paraspinal or trochanters. There is no step-off or paraspinal spasm. · Range of Motion: He is able to sit forward flex without pain extension was not assessed   · Strength:   Strength testing is 5/5 in all muscle groups tested.    · Special Tests:  Reflexes are symmetrically trace  at the patellar and ankle tendons. Clonus absent bilaterally at the feet. · Gait & station: Walk slowly with a walker   · Additional Examinations:   · RIGHT LOWER EXTREMITY: Inspection/examination of the right lower extremity does not show any tenderness, deformity or injury. Range of motion is full. There is no gross instability. There are no rashes, ulcerations or lesions. Strength and tone are normal.  · LEFT LOWER EXTREMITY:  Inspection/examination of the left lower extremity does not show any tenderness, deformity or injury. Range of motion is full. There is no gross instability. There are no rashes, ulcerations or lesions. Strength and tone are normal.    Diagnostic Testing:    March 2018 comprehensive panel serology shows  blood glucose 128, CBC normal    CT lumbar spine reviewed showing sclerosis of bilateral sacroiliac joints, no fracture, he has underlying multilevel DDD and spondylosis with scoliosis diffuse osteopenia. (There is at least mild multilevel central stenosis relieved visualized on CT)    CT hip 5/9/2019  1. No acute osseous abnormality identified. 2. Moderate to severe left hip and severe advanced right hip osteoarthritis. 3. Osteopenia. 4. Severe degenerative changes of the partially imaged lower lumbar spine. 5. Severe atherosclerotic disease. Impression:  1) Chronic right sacroiliac pain, mild right lumbar radiculitis--85% improved since SHANE    2) Refractory chronic right hip pain followed by Dr. Anna Cantor  3) Osteopenia  4) No relief with SI injection         Plan:   1) Recommend PT for above. His goal is to improve independence with ADLs    2) We did discuss right L4 5 TX SHANE #3 if radiating pain returns or worsens. May call to proceed.          Elias Smalls PA-C

## 2019-08-09 NOTE — TELEPHONE ENCOUNTER
Pt needs refills to cover him tioll his appt 9/18 on the CARTIA  MG extended release capsule     :  [address], [address] [place] [address]

## 2019-08-13 NOTE — TELEPHONE ENCOUNTER
LMOVM please return call :  I called pharm and Pt has refills left at pharm. If you need refills now just go to pharm and pick them up.

## 2019-08-29 NOTE — PROGRESS NOTES
Larrydamaris Sukhjinder  [facility]#[id number]  LOT#- [id number]  EXP:04/2023    [facility] LIDOCAINE  NDC#-1210-6566-04  LOT#[id number]  EXP: 11/2020    2C DEPO  NDC#-[id number]  [facility]#-[id number]  EXP: 02/2021    SITE: RIGHT SHOULDER
Carbocaine, 4 mL of 0.25% Marcaine, and 80 mg of Depo-Medrol was injected under sterile technique. The needle was withdrawn and the puncture site sealed with a Band-Aid. Technique: Under sterile conditions a [employer] ultrasound unit with a variable frequency (6.0-15.0 MHz) linear transducer was used to localize the placement of a 22-gauge needle into the RIGHT shoulder intra-articular space. Findings: Successful needle placement for intra-articular injection. Final images were taken and saved for permanent record. The patient tolerated the injection well. The patient was instructed to call the office immediately if there is any pain, redness, warmth, fever, or chills.

## 2019-09-18 NOTE — PROGRESS NOTES
[facility]   Electrophysiology  Note              Date:  September 18, 2019  Patient name: Asia Silver  YOB: 1924    Primary Care physician: Paul aFn MD    HISTORY OF PRESENT ILLNESS: The patient is a 80 y.o.  male with a history of sinus node dysfunction, PAT, syncope, HTN, and TIA. He had a dual chamber pacemaker implanted in 6/2013. Echo in 6/2015 showed an EF of 55-60% and mild AS. Device checks have shown PAT, PACs, and PVCs. He was admitted in 4/2019 with fall versus syncope and device check was normal.      Today he is being seen for sinus node dysfunction and atrial tachycardia. Device check shows normal function, PAT, and NSVT. He denies chest pain, palpitations, shortness of breath, syncope, and dizziness. Past Medical History:   has a past medical history of Arthritis, BPH (benign prostatic hypertrophy), Cancer ([facility] [address] Dementia, Frequency of urination, Glaucoma, Hypertension, Incontinence, and Unspecified cerebral artery occlusion with cerebral infarction. Past Surgical History:   has a past surgical history that includes Cataract removal with implant (Bilateral); Tonsillectomy; other surgical history (3/21/2013); Pacemaker insertion; hip surgery (Right, 09/06/2016); other surgical history (Right, 02/2017); other surgical history (Right, 08/15/2017); Kidney transplant; epidural steroid injection (Right, 2/19/2019); and epidural steroid injection (Right, 4/23/2019). Home Medications:    Prior to Admission medications    Medication Sig Start Date End Date Taking?  Authorizing Provider   diltiazem (CARDIZEM CD) 240 MG extended release capsule TAKE ONE CAPSULE BY MOUTH DAILY 9/6/19  Yes JUSTUS Harmon CNP   melatonin 5 MG TABS tablet Take 1 tablet by mouth nightly as needed (insomnia) 4/19/19  Yes Lindsey Glynn MD   Fesoterodine Fumarate ER (TOVIAZ) 8 MG TB24 Take by mouth   Yes Historical Provider, MD   diclofenac sodium 1 % GEL

## 2020-01-01 ENCOUNTER — APPOINTMENT (OUTPATIENT)
Dept: GENERAL RADIOLOGY | Age: 85
DRG: 177 | End: 2020-01-01
Payer: MEDICARE

## 2020-01-01 ENCOUNTER — APPOINTMENT (OUTPATIENT)
Dept: CT IMAGING | Age: 85
DRG: 177 | End: 2020-01-01
Payer: MEDICARE

## 2020-01-01 ENCOUNTER — OFFICE VISIT (OUTPATIENT)
Dept: ORTHOPEDIC SURGERY | Age: 85
End: 2020-01-01
Payer: MEDICARE

## 2020-01-01 ENCOUNTER — HOSPITAL ENCOUNTER (INPATIENT)
Age: 85
LOS: 5 days | DRG: 177 | End: 2020-02-06
Attending: EMERGENCY MEDICINE | Admitting: INTERNAL MEDICINE
Payer: MEDICARE

## 2020-01-01 ENCOUNTER — APPOINTMENT (OUTPATIENT)
Dept: VASCULAR LAB | Age: 85
DRG: 177 | End: 2020-01-01
Payer: MEDICARE

## 2020-01-01 ENCOUNTER — APPOINTMENT (OUTPATIENT)
Dept: ULTRASOUND IMAGING | Age: 85
DRG: 177 | End: 2020-01-01
Payer: MEDICARE

## 2020-01-01 VITALS — RESPIRATION RATE: 12 BRPM | HEIGHT: 63 IN | WEIGHT: 136.91 LBS | BODY MASS INDEX: 24.26 KG/M2

## 2020-01-01 VITALS
OXYGEN SATURATION: 90 % | TEMPERATURE: 98.6 F | RESPIRATION RATE: 22 BRPM | DIASTOLIC BLOOD PRESSURE: 59 MMHG | BODY MASS INDEX: 24.8 KG/M2 | WEIGHT: 140 LBS | SYSTOLIC BLOOD PRESSURE: 118 MMHG | HEART RATE: 95 BPM | HEIGHT: 63 IN

## 2020-01-01 LAB
A/G RATIO: 1.4 (ref 1.1–2.2)
ALBUMIN SERPL-MCNC: 3 G/DL (ref 3.4–5)
ALBUMIN SERPL-MCNC: 3.8 G/DL (ref 3.4–5)
ALP BLD-CCNC: 74 U/L (ref 40–129)
ALP BLD-CCNC: 77 U/L (ref 40–129)
ALT SERPL-CCNC: 35 U/L (ref 10–40)
ALT SERPL-CCNC: 44 U/L (ref 10–40)
ANION GAP SERPL CALCULATED.3IONS-SCNC: 10 MMOL/L (ref 3–16)
ANION GAP SERPL CALCULATED.3IONS-SCNC: 15 MMOL/L (ref 3–16)
ANION GAP SERPL CALCULATED.3IONS-SCNC: 17 MMOL/L (ref 3–16)
AST SERPL-CCNC: 33 U/L (ref 15–37)
AST SERPL-CCNC: 58 U/L (ref 15–37)
BACTERIA: ABNORMAL /HPF
BASOPHILS ABSOLUTE: 0.1 K/UL (ref 0–0.2)
BASOPHILS ABSOLUTE: 0.1 K/UL (ref 0–0.2)
BASOPHILS RELATIVE PERCENT: 0.4 %
BASOPHILS RELATIVE PERCENT: 0.4 %
BILIRUB SERPL-MCNC: 0.9 MG/DL (ref 0–1)
BILIRUB SERPL-MCNC: 1.3 MG/DL (ref 0–1)
BILIRUBIN DIRECT: 0.4 MG/DL (ref 0–0.3)
BILIRUBIN URINE: ABNORMAL
BILIRUBIN URINE: NEGATIVE
BILIRUBIN, INDIRECT: 0.5 MG/DL (ref 0–1)
BLOOD CULTURE, ROUTINE: ABNORMAL
BLOOD CULTURE, ROUTINE: ABNORMAL
BLOOD, URINE: ABNORMAL
BLOOD, URINE: ABNORMAL
BUN BLDV-MCNC: 16 MG/DL (ref 7–20)
BUN BLDV-MCNC: 32 MG/DL (ref 7–20)
BUN BLDV-MCNC: 37 MG/DL (ref 7–20)
CALCIUM SERPL-MCNC: 8.2 MG/DL (ref 8.3–10.6)
CALCIUM SERPL-MCNC: 8.2 MG/DL (ref 8.3–10.6)
CALCIUM SERPL-MCNC: 8.9 MG/DL (ref 8.3–10.6)
CHLORIDE BLD-SCNC: 107 MMOL/L (ref 99–110)
CHLORIDE BLD-SCNC: 116 MMOL/L (ref 99–110)
CHLORIDE BLD-SCNC: 98 MMOL/L (ref 99–110)
CHOLESTEROL, TOTAL: 162 MG/DL (ref 0–199)
CLARITY: CLEAR
CLARITY: CLEAR
CO2: 18 MMOL/L (ref 21–32)
CO2: 20 MMOL/L (ref 21–32)
CO2: 23 MMOL/L (ref 21–32)
COLOR: YELLOW
COLOR: YELLOW
CREAT SERPL-MCNC: 0.8 MG/DL (ref 0.8–1.3)
CREAT SERPL-MCNC: 0.8 MG/DL (ref 0.8–1.3)
CREAT SERPL-MCNC: 1.3 MG/DL (ref 0.8–1.3)
EKG ATRIAL RATE: 103 BPM
EKG ATRIAL RATE: 83 BPM
EKG DIAGNOSIS: NORMAL
EKG DIAGNOSIS: NORMAL
EKG P AXIS: 61 DEGREES
EKG P AXIS: 61 DEGREES
EKG P-R INTERVAL: 166 MS
EKG P-R INTERVAL: 190 MS
EKG Q-T INTERVAL: 402 MS
EKG Q-T INTERVAL: 406 MS
EKG QRS DURATION: 134 MS
EKG QRS DURATION: 134 MS
EKG QTC CALCULATION (BAZETT): 472 MS
EKG QTC CALCULATION (BAZETT): 531 MS
EKG R AXIS: -49 DEGREES
EKG R AXIS: -86 DEGREES
EKG T AXIS: 6 DEGREES
EKG T AXIS: 78 DEGREES
EKG VENTRICULAR RATE: 103 BPM
EKG VENTRICULAR RATE: 83 BPM
EOSINOPHILS ABSOLUTE: 0 K/UL (ref 0–0.6)
EOSINOPHILS ABSOLUTE: 0.3 K/UL (ref 0–0.6)
EOSINOPHILS RELATIVE PERCENT: 0 %
EOSINOPHILS RELATIVE PERCENT: 2 %
EPITHELIAL CELLS, UA: ABNORMAL /HPF
EPITHELIAL CELLS, UA: ABNORMAL /HPF
ESTIMATED AVERAGE GLUCOSE: 122.6 MG/DL
GFR AFRICAN AMERICAN: >60
GFR NON-AFRICAN AMERICAN: 51
GFR NON-AFRICAN AMERICAN: >60
GFR NON-AFRICAN AMERICAN: >60
GLOBULIN: 2.8 G/DL
GLUCOSE BLD-MCNC: 138 MG/DL (ref 70–99)
GLUCOSE BLD-MCNC: 161 MG/DL (ref 70–99)
GLUCOSE BLD-MCNC: 179 MG/DL (ref 70–99)
GLUCOSE URINE: NEGATIVE MG/DL
GLUCOSE URINE: NEGATIVE MG/DL
HAV IGM SER IA-ACNC: NORMAL
HBA1C MFR BLD: 5.9 %
HCT VFR BLD CALC: 34.8 % (ref 40.5–52.5)
HCT VFR BLD CALC: 35.3 % (ref 40.5–52.5)
HCT VFR BLD CALC: 37.5 % (ref 40.5–52.5)
HCT VFR BLD CALC: 41.3 % (ref 40.5–52.5)
HDLC SERPL-MCNC: 49 MG/DL (ref 40–60)
HEMOGLOBIN: 11.4 G/DL (ref 13.5–17.5)
HEMOGLOBIN: 11.6 G/DL (ref 13.5–17.5)
HEMOGLOBIN: 12.2 G/DL (ref 13.5–17.5)
HEMOGLOBIN: 13.4 G/DL (ref 13.5–17.5)
HEPATITIS B CORE IGM ANTIBODY: NORMAL
HEPATITIS B SURFACE ANTIGEN INTERPRETATION: NORMAL
HEPATITIS C ANTIBODY INTERPRETATION: NORMAL
KETONES, URINE: NEGATIVE MG/DL
KETONES, URINE: NEGATIVE MG/DL
LACTIC ACID: 1.6 MMOL/L (ref 0.4–2)
LDL CHOLESTEROL CALCULATED: 89 MG/DL
LEUKOCYTE ESTERASE, URINE: ABNORMAL
LEUKOCYTE ESTERASE, URINE: NEGATIVE
LV EF: 55 %
LVEF MODALITY: NORMAL
LYMPHOCYTES ABSOLUTE: 0.2 K/UL (ref 1–5.1)
LYMPHOCYTES ABSOLUTE: 1.2 K/UL (ref 1–5.1)
LYMPHOCYTES RELATIVE PERCENT: 1.5 %
LYMPHOCYTES RELATIVE PERCENT: 9.1 %
MCH RBC QN AUTO: 31.4 PG (ref 26–34)
MCH RBC QN AUTO: 31.5 PG (ref 26–34)
MCH RBC QN AUTO: 31.7 PG (ref 26–34)
MCH RBC QN AUTO: 31.8 PG (ref 26–34)
MCHC RBC AUTO-ENTMCNC: 32.5 G/DL (ref 31–36)
MCHC RBC AUTO-ENTMCNC: 32.7 G/DL (ref 31–36)
MCHC RBC AUTO-ENTMCNC: 32.8 G/DL (ref 31–36)
MCHC RBC AUTO-ENTMCNC: 32.9 G/DL (ref 31–36)
MCV RBC AUTO: 95.8 FL (ref 80–100)
MCV RBC AUTO: 96.2 FL (ref 80–100)
MCV RBC AUTO: 96.6 FL (ref 80–100)
MCV RBC AUTO: 97.9 FL (ref 80–100)
MICROSCOPIC EXAMINATION: YES
MICROSCOPIC EXAMINATION: YES
MONOCYTES ABSOLUTE: 0.8 K/UL (ref 0–1.3)
MONOCYTES ABSOLUTE: 0.9 K/UL (ref 0–1.3)
MONOCYTES RELATIVE PERCENT: 5.1 %
MONOCYTES RELATIVE PERCENT: 6.8 %
NEUTROPHILS ABSOLUTE: 10.9 K/UL (ref 1.7–7.7)
NEUTROPHILS ABSOLUTE: 13.7 K/UL (ref 1.7–7.7)
NEUTROPHILS RELATIVE PERCENT: 83.7 %
NEUTROPHILS RELATIVE PERCENT: 91 %
NITRITE, URINE: NEGATIVE
NITRITE, URINE: NEGATIVE
ORGANISM: ABNORMAL
ORGANISM: ABNORMAL
PDW BLD-RTO: 14.2 % (ref 12.4–15.4)
PDW BLD-RTO: 14.2 % (ref 12.4–15.4)
PDW BLD-RTO: 14.3 % (ref 12.4–15.4)
PDW BLD-RTO: 15.3 % (ref 12.4–15.4)
PH UA: 6.5 (ref 5–8)
PH UA: 7 (ref 5–8)
PLATELET # BLD: 113 K/UL (ref 135–450)
PLATELET # BLD: 125 K/UL (ref 135–450)
PLATELET # BLD: 185 K/UL (ref 135–450)
PLATELET # BLD: 72 K/UL (ref 135–450)
PLATELET SLIDE REVIEW: ABNORMAL
PMV BLD AUTO: 8.3 FL (ref 5–10.5)
PMV BLD AUTO: 8.6 FL (ref 5–10.5)
PMV BLD AUTO: 9.5 FL (ref 5–10.5)
PMV BLD AUTO: 9.8 FL (ref 5–10.5)
POTASSIUM REFLEX MAGNESIUM: 4.3 MMOL/L (ref 3.5–5.1)
POTASSIUM SERPL-SCNC: 3.3 MMOL/L (ref 3.5–5.1)
POTASSIUM SERPL-SCNC: 3.9 MMOL/L (ref 3.5–5.1)
PROCALCITONIN: 69.97 NG/ML (ref 0–0.15)
PROTEIN UA: 100 MG/DL
PROTEIN UA: 100 MG/DL
RBC # BLD: 3.63 M/UL (ref 4.2–5.9)
RBC # BLD: 3.67 M/UL (ref 4.2–5.9)
RBC # BLD: 3.88 M/UL (ref 4.2–5.9)
RBC # BLD: 4.22 M/UL (ref 4.2–5.9)
RBC UA: >100 /HPF (ref 0–2)
RBC UA: ABNORMAL /HPF (ref 0–2)
RENAL EPITHELIAL, UA: ABNORMAL /HPF
REPORT: NORMAL
SLIDE REVIEW: ABNORMAL
SODIUM BLD-SCNC: 138 MMOL/L (ref 136–145)
SODIUM BLD-SCNC: 140 MMOL/L (ref 136–145)
SODIUM BLD-SCNC: 146 MMOL/L (ref 136–145)
SPECIFIC GRAVITY UA: 1.02 (ref 1–1.03)
SPECIFIC GRAVITY UA: 1.02 (ref 1–1.03)
TOTAL PROTEIN: 5.7 G/DL (ref 6.4–8.2)
TOTAL PROTEIN: 6.6 G/DL (ref 6.4–8.2)
TRIGL SERPL-MCNC: 118 MG/DL (ref 0–150)
TROPONIN: 0.02 NG/ML
TROPONIN: 0.02 NG/ML
TROPONIN: <0.01 NG/ML
URINE CULTURE, ROUTINE: NORMAL
URINE REFLEX TO CULTURE: ABNORMAL
URINE REFLEX TO CULTURE: YES
URINE TYPE: ABNORMAL
URINE TYPE: ABNORMAL
UROBILINOGEN, URINE: 0.2 E.U./DL
UROBILINOGEN, URINE: 0.2 E.U./DL
VLDLC SERPL CALC-MCNC: 24 MG/DL
WBC # BLD: 13 K/UL (ref 4–11)
WBC # BLD: 15 K/UL (ref 4–11)
WBC # BLD: 15.7 K/UL (ref 4–11)
WBC # BLD: 18.4 K/UL (ref 4–11)
WBC UA: ABNORMAL /HPF (ref 0–5)
WBC UA: ABNORMAL /HPF (ref 0–5)

## 2020-01-01 PROCEDURE — 1200000000 HC SEMI PRIVATE

## 2020-01-01 PROCEDURE — 85027 COMPLETE CBC AUTOMATED: CPT

## 2020-01-01 PROCEDURE — 97166 OT EVAL MOD COMPLEX 45 MIN: CPT

## 2020-01-01 PROCEDURE — 99223 1ST HOSP IP/OBS HIGH 75: CPT | Performed by: PSYCHIATRY & NEUROLOGY

## 2020-01-01 PROCEDURE — 80053 COMPREHEN METABOLIC PANEL: CPT

## 2020-01-01 PROCEDURE — 2580000003 HC RX 258: Performed by: NURSE PRACTITIONER

## 2020-01-01 PROCEDURE — 94761 N-INVAS EAR/PLS OXIMETRY MLT: CPT

## 2020-01-01 PROCEDURE — 93010 ELECTROCARDIOGRAM REPORT: CPT | Performed by: INTERNAL MEDICINE

## 2020-01-01 PROCEDURE — 93005 ELECTROCARDIOGRAM TRACING: CPT | Performed by: INTERNAL MEDICINE

## 2020-01-01 PROCEDURE — 87186 SC STD MICRODIL/AGAR DIL: CPT

## 2020-01-01 PROCEDURE — 76705 ECHO EXAM OF ABDOMEN: CPT

## 2020-01-01 PROCEDURE — 2700000000 HC OXYGEN THERAPY PER DAY

## 2020-01-01 PROCEDURE — 73060 X-RAY EXAM OF HUMERUS: CPT

## 2020-01-01 PROCEDURE — 84484 ASSAY OF TROPONIN QUANT: CPT

## 2020-01-01 PROCEDURE — 2500000003 HC RX 250 WO HCPCS: Performed by: NURSE PRACTITIONER

## 2020-01-01 PROCEDURE — 2580000003 HC RX 258: Performed by: INTERNAL MEDICINE

## 2020-01-01 PROCEDURE — 1036F TOBACCO NON-USER: CPT | Performed by: ORTHOPAEDIC SURGERY

## 2020-01-01 PROCEDURE — 6370000000 HC RX 637 (ALT 250 FOR IP): Performed by: INTERNAL MEDICINE

## 2020-01-01 PROCEDURE — 36415 COLL VENOUS BLD VENIPUNCTURE: CPT

## 2020-01-01 PROCEDURE — 87086 URINE CULTURE/COLONY COUNT: CPT

## 2020-01-01 PROCEDURE — 85025 COMPLETE CBC W/AUTO DIFF WBC: CPT

## 2020-01-01 PROCEDURE — 6360000004 HC RX CONTRAST MEDICATION: Performed by: EMERGENCY MEDICINE

## 2020-01-01 PROCEDURE — 83605 ASSAY OF LACTIC ACID: CPT

## 2020-01-01 PROCEDURE — 80074 ACUTE HEPATITIS PANEL: CPT

## 2020-01-01 PROCEDURE — 70450 CT HEAD/BRAIN W/O DYE: CPT

## 2020-01-01 PROCEDURE — 84145 PROCALCITONIN (PCT): CPT

## 2020-01-01 PROCEDURE — 81001 URINALYSIS AUTO W/SCOPE: CPT

## 2020-01-01 PROCEDURE — 97530 THERAPEUTIC ACTIVITIES: CPT

## 2020-01-01 PROCEDURE — 92526 ORAL FUNCTION THERAPY: CPT

## 2020-01-01 PROCEDURE — 6360000002 HC RX W HCPCS: Performed by: NURSE PRACTITIONER

## 2020-01-01 PROCEDURE — 4040F PNEUMOC VAC/ADMIN/RCVD: CPT | Performed by: ORTHOPAEDIC SURGERY

## 2020-01-01 PROCEDURE — 80076 HEPATIC FUNCTION PANEL: CPT

## 2020-01-01 PROCEDURE — 99233 SBSQ HOSP IP/OBS HIGH 50: CPT | Performed by: PSYCHIATRY & NEUROLOGY

## 2020-01-01 PROCEDURE — 71045 X-RAY EXAM CHEST 1 VIEW: CPT

## 2020-01-01 PROCEDURE — 97162 PT EVAL MOD COMPLEX 30 MIN: CPT

## 2020-01-01 PROCEDURE — 6360000002 HC RX W HCPCS: Performed by: INTERNAL MEDICINE

## 2020-01-01 PROCEDURE — 87040 BLOOD CULTURE FOR BACTERIA: CPT

## 2020-01-01 PROCEDURE — 99291 CRITICAL CARE FIRST HOUR: CPT

## 2020-01-01 PROCEDURE — G8420 CALC BMI NORM PARAMETERS: HCPCS | Performed by: ORTHOPAEDIC SURGERY

## 2020-01-01 PROCEDURE — 51702 INSERT TEMP BLADDER CATH: CPT

## 2020-01-01 PROCEDURE — 6370000000 HC RX 637 (ALT 250 FOR IP): Performed by: NURSE PRACTITIONER

## 2020-01-01 PROCEDURE — 99214 OFFICE O/P EST MOD 30 MIN: CPT | Performed by: ORTHOPAEDIC SURGERY

## 2020-01-01 PROCEDURE — 74177 CT ABD & PELVIS W/CONTRAST: CPT

## 2020-01-01 PROCEDURE — 80061 LIPID PANEL: CPT

## 2020-01-01 PROCEDURE — 80048 BASIC METABOLIC PNL TOTAL CA: CPT

## 2020-01-01 PROCEDURE — G8428 CUR MEDS NOT DOCUMENT: HCPCS | Performed by: ORTHOPAEDIC SURGERY

## 2020-01-01 PROCEDURE — 87150 DNA/RNA AMPLIFIED PROBE: CPT

## 2020-01-01 PROCEDURE — 83036 HEMOGLOBIN GLYCOSYLATED A1C: CPT

## 2020-01-01 PROCEDURE — 93005 ELECTROCARDIOGRAM TRACING: CPT | Performed by: EMERGENCY MEDICINE

## 2020-01-01 PROCEDURE — 20610 DRAIN/INJ JOINT/BURSA W/O US: CPT | Performed by: ORTHOPAEDIC SURGERY

## 2020-01-01 PROCEDURE — 1123F ACP DISCUSS/DSCN MKR DOCD: CPT | Performed by: ORTHOPAEDIC SURGERY

## 2020-01-01 PROCEDURE — G8484 FLU IMMUNIZE NO ADMIN: HCPCS | Performed by: ORTHOPAEDIC SURGERY

## 2020-01-01 PROCEDURE — 6370000000 HC RX 637 (ALT 250 FOR IP): Performed by: EMERGENCY MEDICINE

## 2020-01-01 PROCEDURE — 93306 TTE W/DOPPLER COMPLETE: CPT

## 2020-01-01 PROCEDURE — 92610 EVALUATE SWALLOWING FUNCTION: CPT

## 2020-01-01 PROCEDURE — 93880 EXTRACRANIAL BILAT STUDY: CPT

## 2020-01-01 RX ORDER — SODIUM CHLORIDE 0.9 % (FLUSH) 0.9 %
10 SYRINGE (ML) INJECTION EVERY 12 HOURS SCHEDULED
Status: DISCONTINUED | OUTPATIENT
Start: 2020-01-01 | End: 2020-01-01 | Stop reason: HOSPADM

## 2020-01-01 RX ORDER — DEXTROSE, SODIUM CHLORIDE, AND POTASSIUM CHLORIDE 5; .45; .15 G/100ML; G/100ML; G/100ML
INJECTION INTRAVENOUS CONTINUOUS
Status: DISCONTINUED | OUTPATIENT
Start: 2020-01-01 | End: 2020-01-01

## 2020-01-01 RX ORDER — ACETAMINOPHEN 650 MG/1
650 SUPPOSITORY RECTAL EVERY 4 HOURS PRN
Status: DISCONTINUED | OUTPATIENT
Start: 2020-01-01 | End: 2020-01-01 | Stop reason: HOSPADM

## 2020-01-01 RX ORDER — LEVOFLOXACIN 5 MG/ML
250 INJECTION, SOLUTION INTRAVENOUS EVERY 24 HOURS
Status: DISCONTINUED | OUTPATIENT
Start: 2020-01-01 | End: 2020-01-01

## 2020-01-01 RX ORDER — MORPHINE SULFATE 2 MG/ML
2 INJECTION, SOLUTION INTRAMUSCULAR; INTRAVENOUS EVERY 4 HOURS PRN
Status: DISCONTINUED | OUTPATIENT
Start: 2020-01-01 | End: 2020-01-01

## 2020-01-01 RX ORDER — MORPHINE SULFATE 2 MG/ML
2 INJECTION, SOLUTION INTRAMUSCULAR; INTRAVENOUS
Status: DISCONTINUED | OUTPATIENT
Start: 2020-01-01 | End: 2020-01-01 | Stop reason: HOSPADM

## 2020-01-01 RX ORDER — DILTIAZEM HYDROCHLORIDE 240 MG/1
240 CAPSULE, COATED, EXTENDED RELEASE ORAL DAILY
Status: DISCONTINUED | OUTPATIENT
Start: 2020-01-01 | End: 2020-01-01

## 2020-01-01 RX ORDER — MEMANTINE HYDROCHLORIDE 5 MG/1
10 TABLET ORAL 2 TIMES DAILY
Status: DISCONTINUED | OUTPATIENT
Start: 2020-01-01 | End: 2020-01-01

## 2020-01-01 RX ORDER — TROSPIUM CHLORIDE 20 MG/1
20 TABLET, FILM COATED ORAL
Status: DISCONTINUED | OUTPATIENT
Start: 2020-01-01 | End: 2020-01-01

## 2020-01-01 RX ORDER — TIMOLOL MALEATE 5 MG/ML
1 SOLUTION/ DROPS OPHTHALMIC DAILY
Status: DISCONTINUED | OUTPATIENT
Start: 2020-01-01 | End: 2020-01-01

## 2020-01-01 RX ORDER — HYDROCODONE BITARTRATE AND ACETAMINOPHEN 5; 325 MG/1; MG/1
1 TABLET ORAL ONCE
Status: COMPLETED | OUTPATIENT
Start: 2020-01-01 | End: 2020-01-01

## 2020-01-01 RX ORDER — ATROPINE SULFATE 10 MG/ML
1 SOLUTION/ DROPS OPHTHALMIC 3 TIMES DAILY PRN
Status: DISCONTINUED | OUTPATIENT
Start: 2020-01-01 | End: 2020-01-01 | Stop reason: HOSPADM

## 2020-01-01 RX ORDER — LEVOFLOXACIN 5 MG/ML
500 INJECTION, SOLUTION INTRAVENOUS ONCE
Status: COMPLETED | OUTPATIENT
Start: 2020-01-01 | End: 2020-01-01

## 2020-01-01 RX ORDER — HYDROCODONE BITARTRATE AND ACETAMINOPHEN 5; 325 MG/1; MG/1
TABLET ORAL
Status: DISPENSED
Start: 2020-01-01 | End: 2020-01-01

## 2020-01-01 RX ORDER — SODIUM CHLORIDE 9 MG/ML
INJECTION, SOLUTION INTRAVENOUS CONTINUOUS
Status: DISCONTINUED | OUTPATIENT
Start: 2020-01-01 | End: 2020-01-01

## 2020-01-01 RX ORDER — ATORVASTATIN CALCIUM 40 MG/1
40 TABLET, FILM COATED ORAL NIGHTLY
Status: DISCONTINUED | OUTPATIENT
Start: 2020-01-01 | End: 2020-01-01

## 2020-01-01 RX ORDER — TRIAMCINOLONE ACETONIDE 40 MG/ML
80 INJECTION, SUSPENSION INTRA-ARTICULAR; INTRAMUSCULAR ONCE
Status: COMPLETED | OUTPATIENT
Start: 2020-01-01 | End: 2020-01-01

## 2020-01-01 RX ORDER — ASPIRIN 300 MG/1
300 SUPPOSITORY RECTAL DAILY
Status: DISCONTINUED | OUTPATIENT
Start: 2020-01-01 | End: 2020-01-01

## 2020-01-01 RX ORDER — ONDANSETRON 2 MG/ML
4 INJECTION INTRAMUSCULAR; INTRAVENOUS EVERY 6 HOURS PRN
Status: DISCONTINUED | OUTPATIENT
Start: 2020-01-01 | End: 2020-01-01 | Stop reason: HOSPADM

## 2020-01-01 RX ORDER — ASPIRIN 81 MG/1
81 TABLET ORAL DAILY
Status: DISCONTINUED | OUTPATIENT
Start: 2020-01-01 | End: 2020-01-01

## 2020-01-01 RX ORDER — LORAZEPAM 2 MG/ML
1 INJECTION INTRAMUSCULAR EVERY 4 HOURS PRN
Status: DISCONTINUED | OUTPATIENT
Start: 2020-01-01 | End: 2020-01-01 | Stop reason: HOSPADM

## 2020-01-01 RX ORDER — SODIUM CHLORIDE 0.9 % (FLUSH) 0.9 %
10 SYRINGE (ML) INJECTION PRN
Status: DISCONTINUED | OUTPATIENT
Start: 2020-01-01 | End: 2020-01-01 | Stop reason: HOSPADM

## 2020-01-01 RX ORDER — LATANOPROST 50 UG/ML
1 SOLUTION/ DROPS OPHTHALMIC NIGHTLY
Status: DISCONTINUED | OUTPATIENT
Start: 2020-01-01 | End: 2020-01-01

## 2020-01-01 RX ORDER — TAMSULOSIN HYDROCHLORIDE 0.4 MG/1
0.4 CAPSULE ORAL DAILY
Status: DISCONTINUED | OUTPATIENT
Start: 2020-01-01 | End: 2020-01-01

## 2020-01-01 RX ORDER — LEVOFLOXACIN 5 MG/ML
500 INJECTION, SOLUTION INTRAVENOUS EVERY 24 HOURS
Status: DISCONTINUED | OUTPATIENT
Start: 2020-01-01 | End: 2020-01-01

## 2020-01-01 RX ORDER — LORAZEPAM 2 MG/ML
0.5 INJECTION INTRAMUSCULAR EVERY 6 HOURS PRN
Status: DISCONTINUED | OUTPATIENT
Start: 2020-01-01 | End: 2020-01-01

## 2020-01-01 RX ADMIN — CEFTRIAXONE SODIUM 1 G: 1 INJECTION, POWDER, FOR SOLUTION INTRAMUSCULAR; INTRAVENOUS at 15:52

## 2020-01-01 RX ADMIN — MORPHINE SULFATE 2 MG: 2 INJECTION, SOLUTION INTRAMUSCULAR; INTRAVENOUS at 23:33

## 2020-01-01 RX ADMIN — DILTIAZEM HYDROCHLORIDE 240 MG: 240 CAPSULE, COATED, EXTENDED RELEASE ORAL at 18:36

## 2020-01-01 RX ADMIN — METRONIDAZOLE 500 MG: 500 INJECTION, SOLUTION INTRAVENOUS at 01:36

## 2020-01-01 RX ADMIN — TIMOLOL MALEATE 1 DROP: 5 SOLUTION/ DROPS OPHTHALMIC at 09:38

## 2020-01-01 RX ADMIN — LATANOPROST 1 DROP: 50 SOLUTION OPHTHALMIC at 20:45

## 2020-01-01 RX ADMIN — ENOXAPARIN SODIUM 40 MG: 40 INJECTION SUBCUTANEOUS at 09:35

## 2020-01-01 RX ADMIN — TRIAMCINOLONE ACETONIDE 80 MG: 40 INJECTION, SUSPENSION INTRA-ARTICULAR; INTRAMUSCULAR at 11:13

## 2020-01-01 RX ADMIN — METRONIDAZOLE 500 MG: 500 INJECTION, SOLUTION INTRAVENOUS at 23:55

## 2020-01-01 RX ADMIN — LATANOPROST 1 DROP: 50 SOLUTION OPHTHALMIC at 21:26

## 2020-01-01 RX ADMIN — IOPAMIDOL 75 ML: 755 INJECTION, SOLUTION INTRAVENOUS at 11:03

## 2020-01-01 RX ADMIN — METRONIDAZOLE 500 MG: 500 INJECTION, SOLUTION INTRAVENOUS at 18:12

## 2020-01-01 RX ADMIN — POTASSIUM CHLORIDE, DEXTROSE MONOHYDRATE AND SODIUM CHLORIDE: 150; 5; 450 INJECTION, SOLUTION INTRAVENOUS at 17:51

## 2020-01-01 RX ADMIN — SODIUM CHLORIDE: 9 INJECTION, SOLUTION INTRAVENOUS at 01:36

## 2020-01-01 RX ADMIN — METRONIDAZOLE 500 MG: 500 INJECTION, SOLUTION INTRAVENOUS at 08:46

## 2020-01-01 RX ADMIN — METRONIDAZOLE 500 MG: 500 INJECTION, SOLUTION INTRAVENOUS at 09:35

## 2020-01-01 RX ADMIN — MORPHINE SULFATE 2 MG: 2 INJECTION, SOLUTION INTRAMUSCULAR; INTRAVENOUS at 16:50

## 2020-01-01 RX ADMIN — LORAZEPAM 1 MG: 2 INJECTION, SOLUTION INTRAMUSCULAR; INTRAVENOUS at 15:56

## 2020-01-01 RX ADMIN — TIMOLOL MALEATE 1 DROP: 5 SOLUTION/ DROPS OPHTHALMIC at 08:46

## 2020-01-01 RX ADMIN — TROSPIUM CHLORIDE 20 MG: 20 TABLET, FILM COATED ORAL at 18:36

## 2020-01-01 RX ADMIN — METRONIDAZOLE 500 MG: 500 INJECTION, SOLUTION INTRAVENOUS at 17:51

## 2020-01-01 RX ADMIN — LATANOPROST 1 DROP: 50 SOLUTION OPHTHALMIC at 21:14

## 2020-01-01 RX ADMIN — MORPHINE SULFATE 2 MG: 2 INJECTION, SOLUTION INTRAMUSCULAR; INTRAVENOUS at 20:45

## 2020-01-01 RX ADMIN — METRONIDAZOLE 500 MG: 500 INJECTION, SOLUTION INTRAVENOUS at 09:24

## 2020-01-01 RX ADMIN — LORAZEPAM 1 MG: 2 INJECTION, SOLUTION INTRAMUSCULAR; INTRAVENOUS at 01:48

## 2020-01-01 RX ADMIN — ATORVASTATIN CALCIUM 40 MG: 40 TABLET, FILM COATED ORAL at 20:36

## 2020-01-01 RX ADMIN — LATANOPROST 1 DROP: 50 SOLUTION OPHTHALMIC at 20:37

## 2020-01-01 RX ADMIN — ATROPINE SULFATE 1 DROP: 10 SOLUTION OPHTHALMIC at 17:33

## 2020-01-01 RX ADMIN — HYDROCODONE BITARTRATE AND ACETAMINOPHEN 1 TABLET: 5; 325 TABLET ORAL at 12:21

## 2020-01-01 RX ADMIN — CEFTRIAXONE SODIUM 1 G: 1 INJECTION, POWDER, FOR SOLUTION INTRAMUSCULAR; INTRAVENOUS at 17:52

## 2020-01-01 RX ADMIN — LORAZEPAM 0.5 MG: 2 INJECTION INTRAMUSCULAR; INTRAVENOUS at 14:51

## 2020-01-01 RX ADMIN — LEVOFLOXACIN 500 MG: 5 INJECTION, SOLUTION INTRAVENOUS at 11:02

## 2020-01-01 RX ADMIN — Medication 10 ML: at 20:39

## 2020-01-01 RX ADMIN — POTASSIUM CHLORIDE, DEXTROSE MONOHYDRATE AND SODIUM CHLORIDE: 150; 5; 450 INJECTION, SOLUTION INTRAVENOUS at 10:25

## 2020-01-01 RX ADMIN — METRONIDAZOLE 500 MG: 500 INJECTION, SOLUTION INTRAVENOUS at 01:47

## 2020-01-01 RX ADMIN — SODIUM CHLORIDE: 9 INJECTION, SOLUTION INTRAVENOUS at 11:02

## 2020-01-01 RX ADMIN — MORPHINE SULFATE 2 MG: 2 INJECTION, SOLUTION INTRAMUSCULAR; INTRAVENOUS at 04:01

## 2020-01-01 RX ADMIN — TIMOLOL MALEATE 1 DROP: 5 SOLUTION/ DROPS OPHTHALMIC at 18:36

## 2020-01-01 RX ADMIN — ENOXAPARIN SODIUM 40 MG: 40 INJECTION SUBCUTANEOUS at 09:59

## 2020-01-01 RX ADMIN — Medication 10 ML: at 11:03

## 2020-01-01 RX ADMIN — CEFTRIAXONE SODIUM 1 G: 1 INJECTION, POWDER, FOR SOLUTION INTRAMUSCULAR; INTRAVENOUS at 16:24

## 2020-01-01 RX ADMIN — Medication: at 18:37

## 2020-01-01 RX ADMIN — ATROPINE SULFATE 1 DROP: 10 SOLUTION OPHTHALMIC at 20:47

## 2020-01-01 RX ADMIN — POTASSIUM CHLORIDE, DEXTROSE MONOHYDRATE AND SODIUM CHLORIDE: 150; 5; 450 INJECTION, SOLUTION INTRAVENOUS at 23:55

## 2020-01-01 RX ADMIN — METRONIDAZOLE 500 MG: 500 INJECTION, SOLUTION INTRAVENOUS at 16:44

## 2020-01-01 RX ADMIN — ENOXAPARIN SODIUM 40 MG: 40 INJECTION SUBCUTANEOUS at 08:46

## 2020-01-01 RX ADMIN — Medication 10 ML: at 20:46

## 2020-01-01 RX ADMIN — TIMOLOL MALEATE 1 DROP: 5 SOLUTION/ DROPS OPHTHALMIC at 11:03

## 2020-01-01 ASSESSMENT — PAIN SCALES - PAIN ASSESSMENT IN ADVANCED DEMENTIA (PAINAD)
NEGVOCALIZATION: 0
FACIALEXPRESSION: 0
BREATHING: 2
BREATHING: 2
BREATHING: 0
BODYLANGUAGE: 0
BREATHING: 2
CONSOLABILITY: 0
FACIALEXPRESSION: 0
BODYLANGUAGE: 0
FACIALEXPRESSION: 0
CONSOLABILITY: 0
BODYLANGUAGE: 0
BREATHING: 0
FACIALEXPRESSION: 0
BODYLANGUAGE: 0
NEGVOCALIZATION: 0
NEGVOCALIZATION: 0
FACIALEXPRESSION: 0
CONSOLABILITY: 0
BREATHING: 2
NEGVOCALIZATION: 0
CONSOLABILITY: 0
BREATHING: 0
BREATHING: 0
TOTALSCORE: 2
BREATHING: 2
CONSOLABILITY: 0
BODYLANGUAGE: 0
NEGVOCALIZATION: 0
TOTALSCORE: 0
BREATHING: 0
CONSOLABILITY: 0
BODYLANGUAGE: 0
BODYLANGUAGE: 0
FACIALEXPRESSION: 0
TOTALSCORE: 0
CONSOLABILITY: 0
NEGVOCALIZATION: 0
BODYLANGUAGE: 0
BREATHING: 2
BODYLANGUAGE: 0
FACIALEXPRESSION: 0
TOTALSCORE: 0
BODYLANGUAGE: 0
CONSOLABILITY: 0
BREATHING: 0
NEGVOCALIZATION: 0
FACIALEXPRESSION: 0
CONSOLABILITY: 0
TOTALSCORE: 0
CONSOLABILITY: 0
FACIALEXPRESSION: 0
BODYLANGUAGE: 0
TOTALSCORE: 0
NEGVOCALIZATION: 0
FACIALEXPRESSION: 0
CONSOLABILITY: 0
NEGVOCALIZATION: 0
TOTALSCORE: 0
BODYLANGUAGE: 0
TOTALSCORE: 0
TOTALSCORE: 0
NEGVOCALIZATION: 0
FACIALEXPRESSION: 0
CONSOLABILITY: 0
BREATHING: 0
TOTALSCORE: 2
CONSOLABILITY: 0
BREATHING: 0
FACIALEXPRESSION: 0
TOTALSCORE: 2
TOTALSCORE: 2
BODYLANGUAGE: 0
TOTALSCORE: 2
BODYLANGUAGE: 0
NEGVOCALIZATION: 0
BODYLANGUAGE: 0
CONSOLABILITY: 0
TOTALSCORE: 0
NEGVOCALIZATION: 0
BREATHING: 0
FACIALEXPRESSION: 0
BREATHING: 0
BODYLANGUAGE: 0
NEGVOCALIZATION: 0
TOTALSCORE: 0
TOTALSCORE: 2
FACIALEXPRESSION: 0

## 2020-01-01 ASSESSMENT — ENCOUNTER SYMPTOMS
SORE THROAT: 0
CONSTIPATION: 0
COUGH: 0
ABDOMINAL PAIN: 1
DIARRHEA: 0
VOMITING: 0
BACK PAIN: 0
NAUSEA: 0
SHORTNESS OF BREATH: 0

## 2020-01-01 ASSESSMENT — PAIN SCALES - GENERAL
PAINLEVEL_OUTOF10: 0
PAINLEVEL_OUTOF10: 3
PAINLEVEL_OUTOF10: 5
PAINLEVEL_OUTOF10: 7
PAINLEVEL_OUTOF10: 0
PAINLEVEL_OUTOF10: 0

## 2020-01-01 ASSESSMENT — PAIN DESCRIPTION - PAIN TYPE: TYPE: ACUTE PAIN

## 2020-01-01 ASSESSMENT — PAIN DESCRIPTION - LOCATION: LOCATION: ABDOMEN

## 2020-01-29 NOTE — PROGRESS NOTES
Department of Orthopedic Surgery   Progress Note      Follow-Up: RIGHT Shoulder severe RCTA    Subjective:     Antony returns with his daughter with questions re care    He has pain with use of walker  + rest pain  Aching and sharp catching pain      Objective:     @IPVITALS(24)@    Review of Systems  Pertinent items are noted in HPI  Denies fever, chills, confusion, bowel/bladder active change. Review of systems reviewed from Patient History Form dated on 1/28 and available in the patient's chart under the Media tab. Exam: No effusion, no erythema, pseudoparalysis      Imaging:  Reviewed, severe gr 4 [place]     Office Procedures:  After careful consideration of the risks and benefits, the Right   shoulder   Joint was injected under sterile conditions and well-tolerated. The skin was sterilized initially with alcohol and subsequently with Betadine. 80 mg of triamcinalone and 2 mL of quarter percent plain Marcaine were utilized and injected with a 21-gauge needle. Injection was well tolerated. No local skin reactions. No adverse events. Initial response assessed. Please see associated injection template for [address] #. Assessment:     Severe RCTA. Plan:      1:  Long discussion re Care options; A.  Reverse TSR ; high risk surgical candidate - medical, fall, difficulty with walker for 4-6 weeks    B. Conservative; Injection, heat, topicals, safe use meds    I spent 25+ minutes with the patient including 13+ minutes face to face with the patient discussing and answering questions regarding the risks, benefits, and complications of Arthroplasty in detail. We talked about the care options         Follow-Up Visit:  PRN            [facility]      This dictation was performed with a verbal recognition program (DRAGON) and it was checked for errors. It is possible that there are still dictated errors within this office note. If so, please bring any errors to my attention for an addendum. All efforts were made to ensure that this office note is accurate.

## 2020-02-01 PROBLEM — R47.81 SLURRED SPEECH: Status: ACTIVE | Noted: 2020-01-01

## 2020-02-01 PROBLEM — R79.89 ELEVATED LFTS: Status: ACTIVE | Noted: 2020-01-01

## 2020-02-01 PROBLEM — D72.829 LEUKOCYTOSIS: Status: ACTIVE | Noted: 2020-01-01

## 2020-02-01 PROBLEM — I50.9 CHF (CONGESTIVE HEART FAILURE) (HCC): Status: ACTIVE | Noted: 2020-01-01

## 2020-02-01 PROBLEM — I10 HTN (HYPERTENSION): Status: ACTIVE | Noted: 2020-01-01

## 2020-02-01 NOTE — ED PROVIDER NOTES
[facility]  ED  EMERGENCY DEPARTMENT ENCOUNTER      Pt Name: Deacon Danielle  MRN: [id number]  [place] 6/17/1924  Date of evaluation: 2/1/2020  Provider: Jannet Miller MD    [address]       Chief Complaint   Patient presents with    Fatigue     Staff at [facility] said pt wasn't acting normal    Abdominal Pain     c/o abdominal pain to ems en route    Fall     2 days ago         HISTORY OF PRESENT ILLNESS   (Location/Symptom, Timing/Onset, Context/Setting, Quality, Duration, Modifying Factors, Severity)  Note limiting factors. Deacon Danielle is a 80 y.o. male who presents to the emergency department     Patient is a 60-year-old male with a past medical history of hypertension, TIA, prostate cancer, dementia who presents with slurred speech, confusion, abdominal pain, and right arm pain after a fall yesterday. Initially patient reported that his speech had been abnormal since yesterday morning which was more than 24 hours ago so he was not stroke activated. Later patient's family arrives and they state that patient had a fall yesterday and injured his right arm. Nursing home bandaged it. They saw patient last night and he was mildly confused but speech was normal.  Family states that patient seems very confused now and that he does not know where he is which is abnormal for him and that his speech is significantly changed from his baseline. Patient reported lower abdominal pain and right arm pain. The history is provided by the patient, the EMS personnel and a relative. Nursing Notes were reviewed. REVIEW OF SYSTEMS    (2-9 systems for level 4, 10 or more for level 5)     Review of Systems   Constitutional: Negative for chills and fever. HENT: Negative for congestion and sore throat. Eyes: Negative for visual disturbance. Respiratory: Negative for cough and shortness of breath. Cardiovascular: Negative for chest pain.    Gastrointestinal: Positive for abdominal pain. Negative for constipation, diarrhea, nausea and vomiting. Genitourinary: Negative for dysuria and hematuria. Musculoskeletal: Positive for arthralgias. Negative for back pain and neck pain. Skin: Negative for pallor and rash. Neurological: Positive for speech difficulty. Negative for dizziness, weakness, light-headedness, numbness and headaches. All other systems reviewed and are negative. Except as noted above the remainder of the review of systems was reviewed and negative. PAST MEDICAL HISTORY     Past Medical History:   Diagnosis Date    Arthritis     BPH (benign prostatic hypertrophy)     Cancer ([facility] [address]     prostate    Dementia ([facility] [address]     Frequency of urination     Glaucoma     Hypertension     Incontinence     Unspecified cerebral artery occlusion with cerebral infarction     TIA in 2011         SURGICAL HISTORY       Past Surgical History:   Procedure Laterality Date    CATARACT REMOVAL WITH IMPLANT Bilateral     EPIDURAL STEROID INJECTION Right 2/19/2019    RIGHT INTRA-ARTICULAR RIGHT SACROILIAC INJECTION SITE CONFIRMED BY FLUOROSCOPY performed by Jacklyn Christianson MD at [facility] Right 4/23/2019    RIGHT LUMBAR FOUR FIVE EPIDURAL STEROID INJECTION SITE CONFIRMED BY FLUOROSCOPY performed by Jacklyn Christianson MD at [address] [place] [address] Right 09/06/2016    arthrogram and cortisone injection    KIDNEY TRANSPLANT      OTHER SURGICAL HISTORY  3/21/2013    CYSTOSCOPY,VISUAL INTERNAL URETHROTOMY, TRANSURETHRAL    OTHER SURGICAL HISTORY Right 02/2017    hip injection    OTHER SURGICAL HISTORY Right 08/15/2017    arthrogram/cortisone injection rt hip    PACEMAKER INSERTION      TONSILLECTOMY           CURRENT MEDICATIONS       Previous Medications    CALCIUM CARBONATE-VITAMIN D (CALCIUM + D) 600-200 MG-UNIT TABS    Take 1 tablet by mouth daily.     DICLOFENAC SODIUM 1 % GEL    Apply 4 g topically 4 times daily  None   Social History Narrative    None       SCREENINGS   NIH Stroke Scale  Interval: Baseline  Level of Consciousness (1a. ): Alert  LOC Questions (1b. ): Answers both correctly  LOC Commands (1c. ): Performs both tasks correctly  Best Gaze (2. ): Normal  Visual (3. ): No visual loss  Facial Palsy (4. ): Normal symmetrical movement  Motor Arm, Left (5a. ): No drift  Motor Arm, Right (5b. ): Some effort against gravity  Motor Leg, Left (6a. ): No drift  Motor Leg, Right (6b. ): No drift  Limb Ataxia (7. ): Absent  Sensory (8. ): Normal  Best Language (9. ): Mild to moderate aphasia  Dysarthria (10. ): Mild to moderate dysarthria  Extinction and Inattention (11): No abnormality  Total: 4           PHYSICAL EXAM    (up to 7 for level 4, 8 or more for level 5)     ED Triage Vitals   BP Temp Temp src Pulse Resp SpO2 Height Weight   02/01/20 0923 02/01/20 0923 -- 02/01/20 0923 02/01/20 0923 02/01/20 0923 02/01/20 0920 02/01/20 0920   (!) 168/134 98.1 °F (36.7 °C)  101 14 90 % 5' 3\" (1.6 m) 140 lb (63.5 kg)       Physical Exam  Vitals signs and nursing note reviewed. Constitutional:       General: He is not in acute distress. Appearance: Normal appearance. HENT:      Head: Normocephalic and atraumatic. Nose: Nose normal.      Mouth/Throat:      Mouth: Mucous membranes are moist.   Eyes:      General: No visual field deficit. Conjunctiva/sclera: Conjunctivae normal.   Neck:      Musculoskeletal: Normal range of motion and neck supple. Cardiovascular:      Rate and Rhythm: Normal rate and regular rhythm. Pulses: Normal pulses. Heart sounds: Normal heart sounds. No murmur. Pulmonary:      Effort: Pulmonary effort is normal. No respiratory distress. Breath sounds: Normal breath sounds. Abdominal:      General: There is distension. Palpations: Abdomen is soft. Tenderness: There is abdominal tenderness in the suprapubic area. Musculoskeletal: Normal range of motion. General: No swelling or deformity. Skin:     General: Skin is warm and dry. Neurological:      Mental Status: He is alert. Cranial Nerves: Dysarthria present. No facial asymmetry. Sensory: Sensation is intact. Motor: Motor function is intact. No weakness. Coordination: Coordination is intact. DIAGNOSTIC RESULTS     EKG: All EKG's are interpreted by the Emergency Department Physician who either signs or Co-signs this chart in the absence of a cardiologist.    Sinus tachycardia, rate 103, right bundle branch block, no STEMI, similar to previous EKG dated April 17, 2019    RADIOLOGY:     Interpretation per the Radiologist below, if available at the time of this note:    XR HUMERUS RIGHT (MIN 2 VIEWS)   Final Result   Progressive degenerative change in the right shoulder with a joint effusion. A discrete fracture is not identified. The distal humerus is intact. US GALLBLADDER RUQ   Final Result   Unremarkable right upper quadrant ultrasound. .  No acute abnormality         CT ABDOMEN PELVIS W IV CONTRAST Additional Contrast? None   Final Result   No acute intra-abnormality      Heterogeneous enhancing mass left kidney, likely renal cell carcinoma. Diverticulosis. No diverticulitis. CT Head WO Contrast   Final Result   No acute intracranial abnormality. XR CHEST PORTABLE   Final Result   No acute cardiopulmonary pathology.                LABS:  Labs Reviewed   CBC WITH AUTO DIFFERENTIAL - Abnormal; Notable for the following components:       Result Value    WBC 13.0 (*)     Hemoglobin 13.4 (*)     Neutrophils Absolute 10.9 (*)     All other components within normal limits    Narrative:     Performed at:  [address], [address] [place] [address]   Phone [phone]   COMPREHENSIVE METABOLIC PANEL W/ REFLEX TO MG FOR LOW K - Abnormal; Notable for the following components:    Chloride 98 (*)     Anion Gap 17 (*) elevated bilirubin and elevated liver enzymes. Patient with urinary retention so Deutsch catheter is placed but UA only has small amount of blood but no signs of infection. CT abdomen pelvis obtained which is concerning for mass on kidney and contracted gallbladder with slightly thickened wall. Due to elevated bilirubin and liver enzymes obtained a dedicated right upper quadrant ultrasound which is negative. CT had did not show any acute cardiopulmonary abnormality. Chest x-ray without acute abnormality and x-ray of right humerus did not show any evidence of fracture. Spoke with stroke neurologist later on patient course once patient family arrived and provided the additional history of the closer timeline. They did recommend that patient be admitted for stroke work-up but no further intervention at this time. Patient admitted for further management. CONSULTS:  IP CONSULT TO STROKE TEAM  IP CONSULT TO HOSPITALIST  IP CONSULT TO NEUROLOGY  IP CONSULT TO UROLOGY    PROCEDURES:  Unless otherwise noted below, none     Critical Care  Performed by: Rossana Gilbert MD  Authorized by: Rossana Gilbert MD     Critical care provider statement:     Critical care time (minutes):  32    Critical care time was exclusive of:  Separately billable procedures and treating other patients and teaching time    Critical care was necessary to treat or prevent imminent or life-threatening deterioration of the following conditions: Possible stroke, multiple complaints.     Critical care was time spent personally by me on the following activities:  Development of treatment plan with patient or surrogate, evaluation of patient's response to treatment, examination of patient, obtaining history from patient or surrogate, ordering and performing treatments and interventions, ordering and review of laboratory studies, ordering and review of radiographic studies, pulse oximetry, re-evaluation of patient's condition and review of old

## 2020-02-01 NOTE — H&P
Hospital Medicine History & Physical      PCP: Kaleigh Davis MD    Date of Admission: 2/1/2020    Date of Service: Pt seen/examined on 2/1/20  and Admitted to Inpatient with expected LOS greater than two midnights due to medical therapy. Chief Complaint: fall and slurred speech      History Of Present Illness:  (    80 y.o. male who presented to [facility] with above complaint. He had a fall yesterday and some skin abrasion over the right elbow. He has chronic right shoulder pain and movements are limited over the right upper extremity. He developed some sort of slurred speech yesterday and is more obvious this morning. When the visiting nurse visited  this morning  Nurse  found  That he was a bit confused and  speech was slurred. That's the   reason   he was sent to the emergency room. He normally wears dentures. Currently he does not have dentures and his speech is still slurred. No obvious weakness or numbness over the extremities. He received some pain medication for right elbow pain and he is kind of sleepy. But arousable and obeys commands and answers questions appropriately. Complain abdominal pain in the emergency room and is relieved by catheterization . He  retained urine.   Past Medical History:          Diagnosis Date    Arthritis     BPH (benign prostatic hypertrophy)     Cancer (HCC)     prostate    Dementia ([facility] [address]     Frequency of urination     Glaucoma     Hypertension     Incontinence     Unspecified cerebral artery occlusion with cerebral infarction     TIA in 2011       Past Surgical History:          Procedure Laterality Date    CATARACT REMOVAL WITH IMPLANT Bilateral     EPIDURAL STEROID INJECTION Right 2/19/2019    RIGHT INTRA-ARTICULAR RIGHT SACROILIAC INJECTION SITE CONFIRMED BY FLUOROSCOPY performed by Anitra Cardenas MD at [facility] Right 4/23/2019    RIGHT LUMBAR FOUR FIVE EPIDURAL STEROID INJECTION SITE CONFIRMED BY FLUOROSCOPY performed by Anderson Hennessy MD at [address] Right 09/06/2016    arthrogram and cortisone injection    KIDNEY TRANSPLANT      OTHER SURGICAL HISTORY  3/21/2013    CYSTOSCOPY,VISUAL INTERNAL URETHROTOMY, TRANSURETHRAL    OTHER SURGICAL HISTORY Right 02/2017    hip injection    OTHER SURGICAL HISTORY Right 08/15/2017    arthrogram/cortisone injection rt hip    PACEMAKER INSERTION      TONSILLECTOMY         Medications Prior to Admission:      Prior to Admission medications    Medication Sig Start Date End Date Taking? Authorizing Provider   diltiazem (CARDIZEM CD) 240 MG extended release capsule TAKE ONE CAPSULE BY MOUTH DAILY 10/7/19   JUSTUS Love CNP   melatonin 5 MG TABS tablet Take 1 tablet by mouth nightly as needed (insomnia) 4/19/19   Len Cortés MD   Fesoterodine Fumarate ER (TOVIAZ) 8 MG TB24 Take by mouth    Historical Provider, MD   diclofenac sodium 1 % GEL Apply 4 g topically 4 times daily 1/29/19   SONIA Worley   timolol (TIMOPTIC) 0.5 % ophthalmic solution Place 1 drop into both eyes daily  4/11/16   Historical Provider, MD   latanoprost (XALATAN) 0.005 % ophthalmic solution Place 1 drop into both eyes nightly. Historical Provider, MD   memantine (NAMENDA XR) 28 MG CP24 Take 28 mg by mouth daily. Historical Provider, MD   Calcium Carbonate-Vitamin D (CALCIUM + D) 600-200 MG-UNIT TABS Take 1 tablet by mouth daily. Historical Provider, MD       Allergies:  Pcn [penicillins]    Social History:      The patient currently lives alone     TOBACCO:   reports that he quit smoking about 26 years ago. He quit after 10.00 years of use. He has never used smokeless tobacco.  ETOH:   reports current alcohol use of about 1.0 standard drinks of alcohol per week. E-Cigarettes Vaping or Juuling     Questions Responses    E-Cigarette Use Never User    Start Date     Does device contain nicotine?  Never    Quit Date out CVA-admit, telemetry, neurochecks, MRI of the brain, carotid Doppler, aspirin and statin, PT and swallow eval    Severe right shoulder joint osteoarthritis-pain control and consider orthopedic evaluation as an outpatient    History of prostate cancer and benign prostatic hypertrophy with urinary retention-s/p Deutsch and neurology evaluation    Abdominal pain-secondary urinary retention, relieved with Deutsch, Deutsch care, continue  sanctura    Left kidney lesion-neurology evaluation    Elevated liver function test-not quite sure about the cause, ultrasound of the gallbladder normal in the emergency room hepatitis profile and repeat liver function test in the morning, DC the statin if liver function test worsen,    Dementia-on Namenda    Abnormal EKG/? Artifact- repeat, serial troponin    Leukocytosis-no evidence of infection, continue to monitor    DVT Prophylaxis: Lovenox  Diet: 2 g sodium  Code Status: full    PT/OT Eval Status: Ordered    Dispo -2 days       Fei Quinn MD    Thank you Jessica Castañeda MD for the opportunity to be involved in this patient's care. If you have any questions or concerns please feel free to contact me at [phone].

## 2020-02-02 NOTE — PROGRESS NOTES
Occupational Therapy/Physical Therapy    Chart reviewed, attempted to see pt for evaluation  this date; per RN hold therapy at this time. Re-attempted in pm, RN requesting to hold therapy this date. Will re-attempt as schedule permits and as pt is medically appropriate.   [facility]-YVAN Wang PT, DPT

## 2020-02-02 NOTE — CONSULTS
arthrogram/cortisone injection rt hip    PACEMAKER INSERTION      TONSILLECTOMY         Medication List reviewed: Notably    Current Facility-Administered Medications   Medication Dose Route Frequency Provider Last Rate Last Dose    0.9 % sodium chloride infusion   Intravenous Continuous Aleda Ruma, APRN - CNP 75 mL/hr at 02/02/20 1102      tamsulosin (FLOMAX) capsule 0.4 mg  0.4 mg Oral Daily Fernando Mohan MD        morphine (PF) injection 2 mg  2 mg Intravenous Q4H PRN Aleda Big Horn, APRN - CNP        LORazepam (ATIVAN) injection 0.5 mg  0.5 mg Intravenous Q6H PRN Aleda Pacific, APRN - CNP   0.5 mg at 02/02/20 1451    cefTRIAXone (ROCEPHIN) 1 g IVPB in 50 mL D5W minibag  1 g Intravenous Q24H [facility], APRN -  mL/hr at 02/02/20 1552 1 g at 02/02/20 1552    metronidazole (FLAGYL) 500 mg in NaCl 100 mL IVPB premix  500 mg Intravenous Q8H [facility], APRN - CNP        diltiazem (CARDIZEM CD) extended release capsule 240 mg  240 mg Oral Daily Vikki Bass MD   Stopped at 02/02/20 1103    latanoprost (XALATAN) 0.005 % ophthalmic solution 1 drop  1 drop Both Eyes Nightly Vikki Bass MD   1 drop at 02/01/20 2037    memantine (NAMENDA) tablet 10 mg  10 mg Oral BID Vikki Bass MD   Stopped at 02/02/20 1103    timolol (TIMOPTIC) 0.5 % ophthalmic solution 1 drop  1 drop Both Eyes Daily Vikki Bass MD   1 drop at 02/02/20 1103    sodium chloride flush 0.9 % injection 10 mL  10 mL Intravenous 2 times per day Vikki Bass MD   10 mL at 02/02/20 1103    sodium chloride flush 0.9 % injection 10 mL  10 mL Intravenous PRN Vikki Bass MD        magnesium hydroxide (MILK OF MAGNESIA) 400 MG/5ML suspension 30 mL  30 mL Oral Daily PRN Vikki Bass MD        ondansetron (ZOFRAN) injection 4 mg  4 mg Intravenous Q6H PRN Vikki Bass MD        enoxaparin (LOVENOX) injection 40 mg  40 mg Subcutaneous Daily Vikki Bass MD   Stopped at 02/02/20

## 2020-02-02 NOTE — PROGRESS NOTES
Hospitalist Progress Note      PCP: Jessica Castañeda MD    Date of Admission: 2/1/2020    Chief Complaint: Fall and slurred speech    Hospital Course:  80 y.o. male who presented to [facility] following a fall. He was also noted to be confused with slurred speech. Subjective: Seems SOB with labored respirations. Granddaughter (who is a RN) states she thinks maybe the patient aspirated last evening. Medications:  Reviewed    Infusion Medications    sodium chloride 75 mL/hr at 02/02/20 1102     Scheduled Medications    [START ON 2/3/2020] levofloxacin  250 mg Intravenous Q24H    tamsulosin  0.4 mg Oral Daily    diltiazem  240 mg Oral Daily    latanoprost  1 drop Both Eyes Nightly    memantine  10 mg Oral BID    timolol  1 drop Both Eyes Daily    sodium chloride flush  10 mL Intravenous 2 times per day    enoxaparin  40 mg Subcutaneous Daily    aspirin  81 mg Oral Daily    Or    aspirin  300 mg Rectal Daily    atorvastatin  40 mg Oral Nightly     PRN Meds: morphine, LORazepam, sodium chloride flush, magnesium hydroxide, ondansetron, perflutren lipid microspheres      Intake/Output Summary (Last 24 hours) at 2/2/2020 1418  Last data filed at 2/2/2020 0654  Gross per 24 hour   Intake 100 ml   Output 900 ml   Net -800 ml       Physical Exam Performed:    BP (!) 161/69   Pulse 97   Temp 98.1 °F (36.7 °C) (Oral)   Resp 16   Ht 5' 3\" (1.6 m)   Wt 140 lb (63.5 kg)   SpO2 91%   BMI 24.80 kg/m²     General appearance: Frail appearing female in no apparent distress, appears stated age and cooperative. HEENT: Pupils equal, round, and reactive to light. Conjunctivae/corneas clear. Neck: Supple, with full range of motion. No jugular venous distention. Trachea midline. Respiratory:  Increased respiratory effort. Decreased bilateral bases. 2L.    Cardiovascular: Regular rate and rhythm with normal S1/S2 without murmurs, rubs or gallops.   Abdomen: Soft, non-tender, non-distended

## 2020-02-02 NOTE — PROGRESS NOTES
Advance Care Planning Note    Name: Shonda Turner  YOB: 1924  MRN: [id number]  Admission Date: 2/1/2020  9:12 AM    Date of Discussion: 2/2/2020    Active Diagnoses:   Benign prostatic hyperplasia with urinary obstruction [N40.1, N13.8]       Priority: Medium    Slurred speech [R47.81]    HTN (hypertension) [I10]    Elevated LFTs [R94.5]    Leukocytosis [D72.829]    CHF (congestive heart failure) ([facility]) [I50.9]    Dementia ([facility] [address] [F03.90]    Essential hypertension [I10]    Cardiac pacemaker in situ [Z95.0]            These active diagnoses are of sufficient risk that focused discussion on advance care planning is indicated in order to allow the patient to thoughtfully consider personal goals of care; and, if situations arise that prevent the ability to personally give input, to ensure appropriate representation of their personal desires for different levels and agressiveness of care. Discussion:    Persons present and participating in discussion: Shonda TurnerSai, daughter and granddaughter. Patient's decisional capacity or surrogate:  Surrogate (daughter and son are both co-POA)    Discussion in summary: The patient is 80year old, who has become progressively weak over the past few weeks. He fell prior to admission. He complains of right elbow pain and left hip pain, which imaging was negative. He is currently SOB and lethargic. His family is at the bedside. Discussed code status with family. The son and daughter are both POA. They agree that the patient would not want intubation or CPR. We will continue to try to treat the patient with antibiotics for pneumonia, but would not pursue aggressive treatment, should he decline. Ativan and Morphine ordered for comfort. Code status: DNR-CC    Time Spent: 1000 - 1016    Total time spent face-to-face in education and discussion: 16 minutes.     Electronically signed by JUSTUS Hale CNP on 2/2/2020 at 2:35 PM

## 2020-02-03 NOTE — PLAN OF CARE
Problem: Falls - Risk of:  Goal: Will remain free from falls  Description  Will remain free from falls  Outcome: Ongoing     Problem: HEMODYNAMIC STATUS  Goal: Patient has stable vital signs and fluid balance  Outcome: Ongoing     Problem: ACTIVITY INTOLERANCE/IMPAIRED MOBILITY  Goal: Mobility/activity is maintained at optimum level for patient  Outcome: Ongoing

## 2020-02-03 NOTE — PROGRESS NOTES
Urology Progress Note      Subjective:   Pt has no c/o  Daughter at bedside    Vitals:  BP (!) 145/71   Pulse 79   Temp 97.9 °F (36.6 °C) (Axillary)   Resp 20   Ht 5' 3\" (1.6 m)   Wt 140 lb (63.5 kg)   SpO2 91%   BMI 24.80 kg/m²   Temp  Av °F (36.7 °C)  Min: 97.4 °F (36.3 °C)  Max: 98.4 °F (36.9 °C)    Intake/Output Summary (Last 24 hours) at 2/3/2020 1008  Last data filed at 2/3/2020 7052  Gross per 24 hour   Intake 925 ml   Output 450 ml   Net 475 ml       Exam:   Malik with clear urine    Labs:  WBC:    Lab Results   Component Value Date    WBC 15.0 2020     Hemoglobin/Hematocrit:    Lab Results   Component Value Date    HGB 11.6 2020    HCT 35.3 2020     BMP:    Lab Results   Component Value Date     2020    K 3.3 2020    K 4.3 2020     2020    CO2 18 2020    BUN 32 2020    LABALBU 3.0 2020    CREATININE 1.3 2020    CALCIUM 8.2 2020    GFRAA >60 2020    GFRAA >60 04/10/2013    LABGLOM 51 2020     PT/INR:    Lab Results   Component Value Date    PROTIME 10.9 2019    INR 0.96 2019     PTT:  No results found for: APTT[APTT    Urinalysis:     Urine Culture:      Blood Culture:      Antibiotic Therapy:      Imaging:       Impression/Plan:   Continue malik  Possible voiding trial in the future    Janis Goyal PA-C

## 2020-02-03 NOTE — PROGRESS NOTES
Patient resting quietly with eyes closed. Respirations easy and shallow, mouth breather. Oxygen increased to 3L per n/c with saturation levels at 91%. Opens eyes to name and will nod head to simple questions. Non-verbal at this time. Will squeeze hands to command. Facial grimacing when moved, falls back to sleep and appears comfortable when not being touched. Daughter at bedside, reminded her to call for any questions or concerns, she verbalizes understanding.

## 2020-02-03 NOTE — CONSULTS
In patient Neurology consult        [facility] Neurology      MD Radha Quiles Sa  6/17/1924    Date of Service: 2/3/2020    Referring Physician: Avila Whitmore MD      Reason for the consult and CC: Acute speech impairment and recent fall. History was obtained from detailed reviewing of his record as well as long discussion with the patient's family. The patient is currently obtunded. He is unable to give me any more history. HPI:   The patient is a 80y.o.  years old male with history of dementia, hypertension and remote strokes who was admitted to the hospital over the weekend with a recent fall and slurred speech. Symptoms started 3 days ago. He fell at at his [place] without clear warning. He sustained some mild bruising of his right elbow. No clear triggers or other associated symptom except he developed some slurred speech and was somewhat confused the next day. His nurse denies any witnessed head trauma. The patient was normal after such falling and he was able to communicate clearly after such incident. Degree was severe and duration was persistent. No head trauma or LOC. No witnessed seizure, tongue biting or bladder incontinence. No other relieving or aggravating factors. Patient was transferred to the ED for further work-up. Initial CT of the head showed no acute findings. He was not back to his baseline. He was eventually admitted. The patient remained to be somewhat encephalopathic. He had increased white count of 18 on admission and today is 15 after starting antibiotics. Family denies recent travel or fever or chills. He continues to be somewhat obtunded over the last several hours. At baseline, he is independent and take care of himself with mild assistant. He walks with his walker. Does not have severe dementia according to his son. He cannot have MRI due to pacemaker. Other review system was limited at this time.     Family History Problem Relation Age of Onset    Cancer Mother      Past Surgical History:   Procedure Laterality Date    CATARACT REMOVAL WITH IMPLANT Bilateral     EPIDURAL STEROID INJECTION Right 2019    RIGHT INTRA-ARTICULAR RIGHT SACROILIAC INJECTION SITE CONFIRMED BY FLUOROSCOPY performed by Lynette Weaver MD at [facility] Right 2019    RIGHT LUMBAR FOUR FIVE EPIDURAL STEROID INJECTION SITE CONFIRMED BY FLUOROSCOPY performed by [facility] Weaver MD at [facility] [facility] Right 2016    arthrogram and cortisone injection    KIDNEY TRANSPLANT      OTHER SURGICAL HISTORY  3/21/2013    CYSTOSCOPY,VISUAL INTERNAL URETHROTOMY, TRANSURETHRAL    OTHER SURGICAL HISTORY Right 2017    hip injection    OTHER SURGICAL HISTORY Right 08/15/2017    arthrogram/cortisone injection rt hip    PACEMAKER INSERTION      TONSILLECTOMY          Past Medical History:   Diagnosis Date    Arthritis     BPH (benign prostatic hypertrophy)     Cancer (HCC)     prostate    Dementia ([facility] [address]     Frequency of urination     Glaucoma     Hypertension     Incontinence     Unspecified cerebral artery occlusion with cerebral infarction     TIA in      Social History     Tobacco Use    Smoking status: Former Smoker     Years: 10.00     Last attempt to quit: 1993     Years since quittin.8    Smokeless tobacco: Never Used   Substance Use Topics    Alcohol use:  Yes     Alcohol/week: 1.0 standard drinks     Types: 1 Glasses of wine per week    Drug use: Never     Allergies   Allergen Reactions    Pcn [Penicillins]      Current Facility-Administered Medications   Medication Dose Route Frequency Provider Last Rate Last Dose    0.9 % sodium chloride infusion   Intravenous Continuous JUSTUS Acosta - CNP 75 mL/hr at 20 0136      tamsulosin (FLOMAX) capsule 0.4 mg  0.4 mg Oral Daily Edith Yeager MD        morphine (PF) injection 2 mg  2 mg otherwise obtained from his family. Exam:     Constitutional:   Vitals:    02/03/20 0027 02/03/20 0508 02/03/20 0900 02/03/20 0928   BP: 114/67 (!) 104/56 (!) 145/71    Pulse: 90 83 79    Resp: 22 22 20    Temp: 97.4 °F (36.3 °C) 98.4 °F (36.9 °C) 97.9 °F (36.6 °C)    TempSrc: Axillary Axillary Axillary    SpO2: 91% 91% 91% 91%   Weight:       Height:           General appearance: Obtunded. Eye: No icterus. Fundus: Not tested due to obtundation. Neck: supple  Cardiovascular: No lower leg edema with good pulsation. Mental Status: The patient open his eyes to voice and goes back to sleep  Can respond to painful stimulation  Poor attention and concentration  Unable to assess fund of knowledge and recent and remote memory  Unable to assess language. Cranial Nerves:   II: Pupils: equal, round, reactive to light  III,IV,VI: Extra Ocular Movements are intact. No nystagmus  V: Facial sensation : NT  VII: Facial strength and movements: intact and symmetric  Rest of CN can not be tested due to confusion. Musculoskeletal: The patient was able to squeeze my hands from both right and left. He does have chronic right shoulder pain and injury. Tone: Normal tone. Reflexes: Symmetric 2+ in both arms and legs. Planters: flexor bilaterally. Cerebellar, sensation and gait cannot be tested due to encephalopathy.     Data:  LABS:   Lab Results   Component Value Date     02/03/2020    K 3.3 02/03/2020    K 4.3 02/01/2020     02/03/2020    CO2 18 02/03/2020    BUN 32 02/03/2020    CREATININE 1.3 02/03/2020    GFRAA >60 02/03/2020    GFRAA >60 04/10/2013    LABGLOM 51 02/03/2020    GLUCOSE 138 02/03/2020    PHOS 2.8 11/27/2014    MG 2.20 11/26/2014    CALCIUM 8.2 02/03/2020     Lab Results   Component Value Date    WBC 15.0 02/03/2020    RBC 3.67 02/03/2020    HGB 11.6 02/03/2020    HCT 35.3 02/03/2020    MCV 96.2 02/03/2020    RDW 14.2 02/03/2020     02/03/2020     Lab Results   Component Value Date    INR 0.96 04/17/2019    PROTIME 10.9 04/17/2019       Neuroimaging were independently reviewed by myself and discussed results  family  Reviewed notes from different physicians  Reviewed lab and blood testing    Impression:  Acute encephalopathy, severe. So far no specific etiology. ? Postconcussion, metabolic encephalopathy, underlying sepsis and cannot rule out new ischemic stroke with MRI brain. We will repeat CT head today to rule out any delayed ICH or SDH. Leukocytosis, so far no specific etiology. Rule out sepsis  Recent mechanical fall  Hypertension  Hyperlipidemia  Chronic cognitive impairment    Recommendation:  Repeat CT head today  Aspiration precautions  Hydration  Continue antibiotics  Follow CBC and CMP  Aspirin [place]  Statin if can take orally  Blood pressure monitoring and continue current medications  Speech and swallow evaluation when more awake  Pain control    Long discussion with the patient's family today in counseling and coordination regarding possibility of his encephalopathy, postconcussion and possible metabolic encephalopathy   We will follow. Thank you for referring such patient. If you have any questions regarding my consult note, please don't hesitate to call me. Mariia Jenkins MD  [phone]    This dictation was generated by voice recognition computer software.  Although all attempts are made to edit the dictation for accuracy, there may be errors in the  transcription that are not intended

## 2020-02-03 NOTE — PROGRESS NOTES
Speech Language Pathology  Attempt Note    1st Attempt: SLP received order and spoke with RN who ok'd entry of SLP. Upon entry, pt having testing completed at bedside. SLP to re-attempt as schedule allows. Thank you! 2nd Attempt: SLP re-attempted BSE and pt was working with PT/OT. SLP to re-attempt later as schedule allows. Thank you!     Pedro Cortes, [place], [address] [facility]  Speech-Language Pathologist

## 2020-02-03 NOTE — PROGRESS NOTES
Physical Therapy    Facility/Department: [facility] C5 - MED SURG/ORTHO  Initial Assessment and Treatment    NAME: Alicia Sr  : 1924  MRN: [id number]    Date of Service: 2/3/2020    Discharge Recommendations:  Subacute/Skilled Nursing Facility(assuming patient begins to follow more commands and participates more with therapy)   PT Equipment Recommendations  Equipment Needed: No  Other: defer to facility     Assessment   Body structures, Functions, Activity limitations: Decreased functional mobility ; Decreased cognition;Decreased high-level IADLs;Decreased posture;Decreased ADL status; Decreased endurance;Decreased fine motor control;Decreased ROM; Decreased coordination;Decreased strength;Decreased balance;Decreased safe awareness  Assessment: Patient is a 80year old male who was admitted to [facility] on 20 with slulrred speech and recent fall. Patient's mobility was significantly impaired by the therapy deficits listed above. Patient is a poor historian. However per chart review, patient was at approximately independent level in 2019. PT recommends that this patient receive skilled PT in the SNF setting (assuming patient begins to follow more commands and participates more with therapy), when medically stable, in order to address these deficits and to help him maximize his safety and independence with all functional mobility. PT to continue to follow. Treatment Diagnosis: decreased independence with functional mobility. Specific instructions for Next Treatment: progress mobility as tolerated. Prognosis: Fair  Decision Making: Medium Complexity  PT Education: Goals; Energy Conservation; Injury Prevention;PT Role;Plan of Care;General Safety;Orientation;Precautions; Equipment;Pressure Relief;Transfer Training  Patient Education: Little to no evidence of understanding. Patient will need continued education reinforcement. Barriers to Learning: impaired cognition.    REQUIRES PT FOLLOW UP: Yes  Activity Tolerance  Activity Tolerance: Patient limited by fatigue;Patient limited by cognitive status; Patient limited by endurance  Activity Tolerance: Vitals: 145/71 91% on 2.5 L. 94 BPM.        Patient Diagnosis(es): The primary encounter diagnosis was Slurred speech. Diagnoses of Confusion and Fall, initial encounter were also pertinent to this visit. has a past medical history of Arthritis, BPH (benign prostatic hypertrophy), Cancer ([address] Dementia ([address] Frequency of urination, Glaucoma, Hypertension, Incontinence, and Unspecified cerebral artery occlusion with cerebral infarction. has a past surgical history that includes Cataract removal with implant (Bilateral); Tonsillectomy; other surgical history (3/21/2013); Pacemaker insertion; hip surgery (Right, 09/06/2016); other surgical history (Right, 02/2017); other surgical history (Right, 08/15/2017); Kidney transplant; epidural steroid injection (Right, 2/19/2019); and epidural steroid injection (Right, 4/23/2019). Restrictions  Restrictions/Precautions  Restrictions/Precautions: Cardiac, General Precautions, Fall Risk  Position Activity Restriction  Other position/activity restrictions: Up with assistance. 2.5L O2, malik, telemetry, IV lines. Vision/Hearing  Vision: Impaired  Vision Exceptions: Wears glasses at all times  Hearing: Within functional limits     Subjective  General  Chart Reviewed: Yes  Patient assessed for rehabilitation services?: Yes  Response To Previous Treatment: Not applicable  Family / Caregiver Present: No  Referring Practitioner: Breanne Vallejo MD  Referral Date : 02/01/20  Diagnosis: Slurred speech. Follows Commands: Impaired  Other (Comment): increased time needed to follow simple commands  General Comment  Comments: Patient in supine position in the bed at beginning of therapy evaluation. Subjective  Subjective: Patient agreed to participate.   Pain Screening  Patient Currently in Pain: No  Vital Signs  Patient Currently in Pain: No  Pre Treatment Pain Screening  Intervention List: Patient able to continue with treatment    Orientation  Orientation  Overall Orientation Status: Impaired  Orientation Level: Oriented to person;Disoriented to place; Disoriented to time;Disoriented to situation(Patient was able to say his name and that he was at a hospital. )  Social/Functional History  Social/Functional History  Lives With: Alone  Type of Home: Apartment([facility])  Home Layout: One level  Home Access: Level entry  Bathroom Shower/Tub: Shower chair with back  Can Electric: Grab bars in shower, Grab bars around toilet  [address]: (3 wheeled walker)  [address] [facility] From: Family, Personal care attendant  ADL Assistance: Independent  Homemaking Responsibilities: Yes(fixes breakfast, goes to dining room for lunch and dinner; cleaning lady 1x/wk; friend does laundry)  Meal Prep Responsibility: Secondary  Ambulation Assistance: Independent(john [id number])  Transfer Assistance: Independent  Active : No  Occupation: Retired  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 All information will need to be confirmed by social work. Cognition   Cognition  Overall Cognitive Status: Exceptions  Arousal/Alertness: Delayed responses to stimuli  Following Commands: Inconsistently follows commands  Attention Span: Difficulty attending to directions  Memory: Decreased short term memory;Decreased recall of recent events  Insights: Decreased awareness of deficits  Initiation: Requires cues for some  Sequencing: Requires cues for all    Objective     Observation/Palpation  Posture: Poor    PROM RLE (degrees)  RLE PROM: Exceptions  RLE General PROM: difficult to perform formal ROM/manual muscle testing due to patient's inconsistent command following. Patient able to move both LE against gravity.    AROM RLE (degrees)  RLE AROM: Exceptions  RLE General AROM: difficult to perform formal ROM/manual muscle testing due to patient's inconsistent command following. Patient able to move both LE against gravity. PROM LLE (degrees)  LLE PROM: Exceptions  LLE General PROM: difficult to perform formal ROM/manual muscle testing due to patient's inconsistent command following. Patient able to move both LE against gravity. AROM LLE (degrees)  LLE AROM : Exceptions  LLE General AROM: difficult to perform formal ROM/manual muscle testing due to patient's inconsistent command following. Patient able to move both LE against gravity. Strength RLE  Strength RLE: Exception  Comment: difficult to perform formal ROM/manual muscle testing due to patient's inconsistent command following. Patient able to move both LE against gravity. So grossly at least 3-/5 strength  Strength LLE  Strength LLE: Exception  Comment: difficult to perform formal ROM/manual muscle testing due to patient's inconsistent command following. Patient able to move both LE against gravity. So grossly at least 3-/5 strength  Motor Control  Gross Motor?: Exceptions  Comments: difficult to perform formal ROM/manual muscle testing due to patient's inconsistent command following. Patient able to move both LE against gravity.  So grossly at least 3-/5 strength  Coordination  Heel to Ortiz: Abnormal(patient unable to complete due to fatigue/command following issues)  Sensation  Overall Sensation Status: (unable to accurately assess due to patient's confusion)  Bed mobility  Rolling to Right: Maximum assistance  Supine to Sit: Maximum assistance;2 Person assistance  Sit to Supine: 2 Person assistance;Dependent/Total  Scooting: Dependent/Total(to scoot up in the bed)  Transfers  Sit to Stand: Maximum Assistance;2 Person Assistance  Stand to sit: Maximum Assistance;2 Person Assistance  Bed to Chair: Unable to assess(decreased eccentric control of his hip extensors. )  Comment: assistance. Short term goal 2: Supine <> sit with moderate assistance. Short term goal 3: Sit <> stand with moderate assistance. Short term goal 4: Bed <> chair with moderate assistance. Short term goal 5: Assess ambulation when safe to attempt. Patient Goals   Patient goals : No goals stated.         Therapy Time   Individual Concurrent Group Co-treatment   Time In 0856         Time Out 0916         Minutes 20         Timed Code Treatment Minutes: 10 Minutes(10 minute evaluation)       Ann Oates PT

## 2020-02-03 NOTE — PROGRESS NOTES
Occupational Therapy   Occupational Therapy Initial Assessment/Treatment  Date: 2/3/2020   Patient Name: Keyona Brownlee  MRN: [id number]     : 1924    Date of Service: 2/3/2020    Discharge Recommendations:  Subacute/Skilled Nursing Facility       Assessment   Performance deficits / Impairments: Decreased functional mobility ; Decreased cognition;Decreased ADL status; Decreased endurance;Decreased balance;Decreased strength;Decreased ROM; Decreased posture  After evaluation, pt found to be presenting with the above mentioned occupational performance deficits which are affecting participation in daily living skills. Pt maxA of 2 for bed mobility and standing trial, patient noted with L side weakness and pain in elbow with ROM. TotalA for LE ADLs. Pt was independent PTA from OT noted in 2019. Today no family present and patient oriented x2 and lethargic, unable to verbalize PLOF with therapist. Slurred speech also noted. Pt would benefit from continued skilled occupational therapy to address ADLs, functional mobility, and safety while in acute care. Prognosis: Fair  OT Education: OT Role;Plan of Care;ADL Adaptive Strategies;Transfer Training;Orientation  REQUIRES OT FOLLOW UP: Yes  Activity Tolerance  Activity Tolerance: Patient limited by fatigue  Safety Devices  Safety Devices in place: Yes  Type of devices: Call light within reach;Nurse notified;Gait belt;Left in bed;Bed alarm in place           Patient Diagnosis(es): The primary encounter diagnosis was Slurred speech. Diagnoses of Confusion and Fall, initial encounter were also pertinent to this visit. has a past medical history of Arthritis, BPH (benign prostatic hypertrophy), Cancer ([address] [address] Frequency of urination, Glaucoma, Hypertension, Incontinence, and Unspecified cerebral artery occlusion with cerebral infarction. has a past surgical history that includes Cataract removal with implant (Bilateral);  Tonsillectomy; other Orientation  Overall Orientation Status: Impaired  Orientation Level: Oriented to place;Oriented to person;Disoriented to time;Disoriented to situation     Balance  Sitting Balance: Moderate assistance(progressin to maxA with fatigue and dynamic balance, posterior lean)  Standing Balance: Dependent/Total(maxA of 2 with RW, only able to hold onto R hand onto walker)    ADL  Feeding: NPO  UE Dressing: Maximum assistance  LE Dressing: Dependent/Total  Toileting: Dependent/Total(malik)     Tone RUE  RUE Tone: Normotonic  Tone LUE  LUE Tone: Normotonic  Coordination  Movements Are Fluid And Coordinated: Yes     Bed mobility  Supine to Sit: Maximum assistance;2 Person assistance  Sit to Supine: 2 Person assistance;Dependent/Total  Transfers  Sit to stand: 2 Person assistance;Maximum assistance(up to RW; posterior lean)  Stand to sit: Maximum assistance;2 Person assistance     Cognition  Overall Cognitive Status: Exceptions  Arousal/Alertness: Delayed responses to stimuli  Following Commands: Inconsistently follows commands  Attention Span: Difficulty attending to directions  Memory: Decreased short term memory;Decreased recall of recent events  Insights: Decreased awareness of deficits  Initiation: Requires cues for some  Sequencing: Requires cues for all     LUE PROM (degrees)  LUE PROM: WFL  RUE PROM (degrees)  RUE PROM: WFL  LUE Strength  Gross LUE Strength: (at least 3+/5 grossly)  RUE Strength  RUE Strength Comment: at least 3/5 grossly; griminced with L elbow ROM and shoulder flexion; Pt reports \"bad\" shoulders bilaterally.       Plan   Plan  Times per week: 3-5x's a week while in acute care    AM-PAC Score        AM-[facility] Inpatient Daily Activity Raw Score: 9 (02/03/20 0937)  AM-PAC Inpatient ADL T-Scale Score : 25.33 (02/03/20 0937)  ADL Inpatient CMS 0-100% Score: 79.59 (02/03/20 7236)  ADL Inpatient CMS G-Code Modifier : CL (02/03/20 3836)    Goals  Short term goals  Time Frame for Short term goals: 1 week unless otherwise specified 2/10/2020  Short term goal 1: Pt will complete grooming task with modA   Short term goal 2: Pt will complete 10 reps of BUE AROM exercises to increase strength for ADLs/transfers by 2/08/2020  Short term goal 3: Pt will complete functional transfers with modA of 2 using appropriate device  Patient Goals   Patient goals : non voiced at this time       Therapy Time   Individual Concurrent Group Co-treatment   Time In 0853         Time Out 0918         Minutes 25         Timed Code Treatment Minutes: [facility]       Danay Templeton OTR/L

## 2020-02-03 NOTE — PROGRESS NOTES
Hospitalist Progress Note      PCP: Timmy Larios MD    Date of Admission: 2/1/2020    Chief Complaint: Fall and slurred speech    Hospital Course:  80 y.o. male who presented to [facility] following a fall. He was also noted to be confused with slurred speech. Subjective:   Asleep. Opens eyes in response to name, but not much beyond that. Daughter and son present bedside. Lengthy discussion regarding current status and plan of care, pending neuro inpt. Medications:  Reviewed    Infusion Medications    sodium chloride 75 mL/hr at 02/03/20 0136     Scheduled Medications    tamsulosin  0.4 mg Oral Daily    cefTRIAXone (ROCEPHIN) IV  1 g Intravenous Q24H    metroNIDAZOLE  500 mg Intravenous Q8H    diltiazem  240 mg Oral Daily    latanoprost  1 drop Both Eyes Nightly    memantine  10 mg Oral BID    timolol  1 drop Both Eyes Daily    sodium chloride flush  10 mL Intravenous 2 times per day    enoxaparin  40 mg Subcutaneous Daily    aspirin  81 mg Oral Daily    Or    aspirin  300 mg Rectal Daily    atorvastatin  40 mg Oral Nightly     PRN Meds: morphine, LORazepam, sodium chloride flush, magnesium hydroxide, ondansetron, perflutren lipid microspheres      Intake/Output Summary (Last 24 hours) at 2/3/2020 1207  Last data filed at 2/3/2020 0111  Gross per 24 hour   Intake 925 ml   Output 450 ml   Net 475 ml       Physical Exam Performed:    BP (!) 145/71   Pulse 79   Temp 97.9 °F (36.6 °C) (Axillary)   Resp 20   Ht 5' 3\" (1.6 m)   Wt 140 lb (63.5 kg)   SpO2 91%   BMI 24.80 kg/m²     General appearance: Frail appearing female in no apparent distress, appears stated age and cooperative. HEENT: Pupils equal, round, and reactive to light. Conjunctivae/corneas clear. Neck: Supple, with full range of motion. No jugular venous distention. Trachea midline. Respiratory:  Increased respiratory effort. Decreased bilateral bases.   2L.    Cardiovascular: Regular rate and rhythm with normal S1/S2 without murmurs, rubs or gallops. Abdomen: Soft, non-tender, non-distended with normal bowel sounds. Musculoskeletal: No clubbing, cyanosis or edema bilaterally. Full range of motion without deformity. Skin: Skin color, texture, turgor normal.  No rashes or lesions. Neurologic:  Slurred speech. Able to move all extremities and follow commands. Psychiatric: Alert and oriented, but limited insight. Capillary Refill: Brisk,< 3 seconds   Peripheral Pulses: +2 palpable, equal bilaterally       Labs:   Recent Labs     02/01/20  0943 02/02/20  0617 02/03/20  0658   WBC 13.0* 18.4* 15.0*   HGB 13.4* 12.2* 11.6*   HCT 41.3 37.5* 35.3*    125* 113*     Recent Labs     02/01/20  0943 02/03/20  0658    140   K 4.3 3.3*   CL 98* 107   CO2 23 18*   BUN 16 32*   CREATININE 0.8 1.3   CALCIUM 8.9 8.2*     Recent Labs     02/01/20  0943 02/02/20  0617   AST 58* 33   ALT 44* 35   BILIDIR  --  0.4*   BILITOT 1.3* 0.9   ALKPHOS 77 74     No results for input(s): INR in the last 72 hours. Recent Labs     02/01/20  0943 02/01/20  1934/20  0617   TROPONINI <0.01 0.02* 0.02*       Urinalysis:      Lab Results   Component Value Date    NITRU Negative 02/01/2020    WBCUA 0-2 02/01/2020    BACTERIA 3+ 04/17/2019    RBCUA 5-10 02/01/2020    BLOODU SMALL 02/01/2020    SPECGRAV 1.020 02/01/2020    GLUCOSEU Negative 02/01/2020       Radiology:  XR CHEST PORTABLE   Final Result   Subtle increased left lung base opacity is nonspecific and may represent   atelectasis with aspiration or pneumonia not excluded. XR HUMERUS RIGHT (MIN 2 VIEWS)   Final Result   Progressive degenerative change in the right shoulder with a joint effusion. A discrete fracture is not identified. The distal humerus is intact. US GALLBLADDER RUQ   Final Result   Unremarkable right upper quadrant ultrasound. .  No acute abnormality         CT ABDOMEN PELVIS W IV CONTRAST Additional Contrast? None   Final Result   No acute intra-abnormality      Heterogeneous enhancing mass left kidney, likely renal cell carcinoma. Diverticulosis. No diverticulitis. CT Head WO Contrast   Final Result   No acute intracranial abnormality. XR CHEST PORTABLE   Final Result   No acute cardiopulmonary pathology. Vascular carotid duplex bilateral    (Results Pending)           Assessment/Plan:    Active Hospital Problems    Diagnosis    Benign prostatic hyperplasia with urinary obstruction [N40.1, N13.8]     Priority: Medium    Slurred speech [R47.81]    HTN (hypertension) [I10]    Elevated LFTs [R94.5]    Leukocytosis [D72.829]    CHF (congestive heart failure) (HCC) [I50.9]    Dementia ([address] [F03.90]    Essential hypertension [I10]    Cardiac pacemaker in situ [Z95.0]     Slurred speech - rule out CVA  - CTH negative. - telemetry, neurochecks  - unable to do MRI of the brain due to implanted pacer.  - carotid Doppler, ECHO completed this am  - aspirin and statin  - PT/OT and swallow eval  - neurology consulted.     Severe right shoulder joint osteoarthritis - pain control and consider orthopedic evaluation as an outpatient     History of prostate cancer and benign prostatic hypertrophy with urinary retention- new kidney lesion  - s/p Deutsch and urology evaluation  - f/u ultrasound in 6 months     Abdominal pain - secondary urinary retention, relieved with Deutsch  - continue sanctura.     Left kidney lesion - possible renal cell ca per CT scan. - urology consulted   - discussed with daughter at bedside.     Elevated liver function test - not quite sure about the cause, ultrasound of the gallbladder normal in the emergency room   - hepatitis profile and repeat liver function test in the morning     Dementia - on [place]     Abnormal EKG - serial troponin 0.02. Denies CP. Possibly due to CVA. - check ECHO. Possible aspiration pneumonia   - repeat CXR. - start IV Flagyl and Rocephin (allergy to PCN).   -

## 2020-02-03 NOTE — PROGRESS NOTES
Speech Language Pathology  Facility/Department: [place] [address] - MED SURG/ORTHO   CLINICAL BEDSIDE SWALLOW EVALUATION    NAME: Shonad Turner  : 1924  MRN: [id number]     Recommend NPO except ice chips (1-2 at at time) after oral care as alertness permits and as desired by family for comfort. ST to follow up tomorrow as pt alertness and medical status/schedule permit to assess for appropriateness for return to po intake. Recommend frequent oral care (3-4x/day) with suction to decrease adverse outcomes associated with aspiration. ADMISSION DATE: 2020  ADMITTING DIAGNOSIS: has Benign prostatic hyperplasia with urinary obstruction; Microhematuria; Prostate cancer ([address] Bradycardia; Hyperglycemia; Carotid bruit; Cardiac pacemaker in situ; Lower extremity weakness; Sciatica; Hip osteoarthritis; Hallucinations; TIA (transient ischemic attack); Occlusion and stenosis of carotid artery; Essential hypertension; Occlusion and stenosis of carotid artery with cerebral infarction; Lumbar radiculitis; Primary osteoarthritis of right hip; Primary osteoarthritis of right knee; Trochanteric bursitis, right hip; Primary osteoarthritis of right shoulder; Primary osteoarthritis of both shoulders; PAT (paroxysmal atrial tachycardia) ([address] Premature atrial contraction; NSVT (nonsustained ventricular tachycardia) ([address] Sinus node dysfunction ([address] Dementia ([address] Syncope and collapse; Slurred speech; HTN (hypertension), benign; Elevated LFTs; Leukocytosis; CHF (congestive heart failure) ([address] Acute encephalopathy; and Fall on their problem list.  ONSET DATE: 2020    Recent Chest Xray/CT of Chest: (2020 )  Increasing airspace disease and pleural fluid at the left lung base, most   compatible with pneumonia and/or aspiration with a parapneumonic effusion.      CT HEAD (2020): *repeat CT HEAD planned for this day    No acute intracranial abnormality.           Past Medical History:       Arthritis       BPH (benign prostatic hypertrophy)      Cancer (HCC)       prostate    Dementia (HCC)      Frequency of urination      Glaucoma      Hypertension      Incontinence      Unspecified cerebral artery occlusion with cerebral infarction       TIA in 2011         Allergies:  Pcn [penicillins]    Date of Eval: 2/3/2020  Evaluating Therapist: [place] Rizo    Current Diet level:  Current Diet : NPO  Current Liquid Diet : NPO      Primary Complaint  Patient Complaint: No complaints verbalized; family hoping alertness improves and pt can have something to eat or drink    Pain:  Pain Assessment  Pain Level: 0  Patient's Stated Pain Goal: No pain  Pain Type: Acute pain  Pain Location: Abdomen    Reason for Referral  Anthony Doran was referred for a bedside swallow evaluation to assess the efficiency of his swallow function, identify signs and symptoms of aspiration and make recommendations regarding safe dietary consistencies, effective compensatory strategies, and safe eating environment. Impression  Per MD Note,\" 80 y.o. male who presented to [facility] with above complaint. He had a fall yesterday and some skin abrasion over the right elbow. He has chronic right shoulder pain and movements are limited over the right upper extremity. He developed some sort of slurred speech yesterday and is more obvious this morning. When the visiting nurse visited  this morning  Nurse  found  That he was a bit confused and  speech was slurred. That's the   reason   he was sent to the emergency room. He normally wears dentures. Currently he does not have dentures and his speech is still slurred. No obvious weakness or numbness over the extremities. He received some pain medication for right elbow pain and he is kind of sleepy. But arousable and obeys commands and answers questions appropriately. Complain abdominal pain in the emergency room and is relieved by catheterization . He  retained urine. \"    Order received for re: results and recommendations, role of ST for dysphagia, benefits of oral care  Patient Education Response: No evidence of learning. Daughter and son voice good understanding of info discussed. Dysphagia Diagnosis: Severe oral stage dysphagia  Dysphagia Impression : Severe Oral Dysphagia/Suspected Pharyngeal Dysphagia  Dysphagia Outcome Severity Scale: Level 1: Severe dysphagia- NPO. Unable to tolerate any PO safely     Treatment Plan  Requires SLP Intervention: Yes  Duration/Frequency of Treatment: 3-5x/wk for LOS  D/C Recommendations: To be determined       Recommended Diet and Intervention  Diet Solids Recommendation: NPO  Liquid Consistency Recommendation: Other (comments)(Consider ice chips, one at a time, after oral care as alertness allows)  Recommended Form of Meds: (via alternate route)  Recommendations: Dysphagia treatment  Therapeutic Interventions: Diet tolerance monitoring;Patient/Family education    Compensatory Swallowing Strategies  Compensatory Swallowing Strategies: Other (comments)(oral care prior to ice chips for pleasure if alertness allows)    Treatment/Goals  Short-term Goals  Timeframe for Short-term Goals: 5 days by 02/08/2020  Goal 1: Pt will participate in repeat BSE for potential to return to po intake. Long-term Goals  Timeframe for Long-term Goals: 7 days by 02/10/2020  Goal 1: Pt will demonstrate functional swallow of preferred diet with use of safe swallow strategies to decrease adverse outcomes associated with aspiration    General  Chart Reviewed: Yes  Comments: Order received for BSE. Chart reviewed. Subjective  Subjective: Jennifer/LATOYA wolfe entry of SLP for BSE. Son and daughter present.    Behavior/Cognition: Lethargic;Requires cueing(inconsistently and briefly follows commands.)  Respiratory Status: O2 via nasual cannula  O2 Device: Nasal cannula  Liters of Oxygen: 3 L  Communication Observation: Dysarthria(minimally verbal)  Follows Directions: (incinsistent for basic commands with cues and demonstration)  Dentition: Dentures top; Some missing teeth  Patient Positioning: Upright in bed  Baseline Vocal Quality: Weak  Volitional Cough: Weak  Prior Dysphagia History: No h/o dysphagia  Consistencies Administered: Ice Chips; Thin - teaspoon           Vision/Hearing  Vision  Vision: Impaired  Vision Exceptions: Wears glasses at all times  Hearing  Hearing: Within functional limits    Oral Motor Deficits  Oral/Motor  Oral Motor: (Unable to adequtely assess d/t decreased ability to follow commands)    Oral Phase Dysfunction  Oral Phase  Oral Phase: Exceptions  Oral Phase Dysfunction  Decreased Anterior to Posterior Transit: Thin - teaspoon; Ice chips  Suspected Premature Bolus Loss: Ice chips; Thin - teaspoon  Oral Phase  Oral Phase - Comment: Severe Oral Dysphagia     Indicators of Pharyngeal Phase Dysfunction   Pharyngeal Phase   Pharyngeal: Suspected Pharyngeal Dysphagia    Prognosis  Prognosis  Prognosis for safe diet advancement: guarded  Individuals consulted  Consulted and agree with results and recommendations: Patient; Family member;RN  Family member consulted: son and daughter    Education  Patient Education: Dysphagia Tx: Education completed re: results and recommendations, role of ST for dysphagia, benefits of oral care  Patient Education Response: No evidence of learning  Safety Devices in place: Yes  Type of devices: Left in bed;Bed alarm in place;Call light within reach;Nurse notified(Pt left in care of Rad Tech with no distress noted)       Therapy Time  SLP Individual Minutes  Time In: 1540  Time Out: 1625  Minutes: [address]. Leonel Viveros M.A. CCC-SLP  Speech-Language Pathologist      BILL Erickson  2/3/2020 4:46 PM

## 2020-02-03 NOTE — PLAN OF CARE
Problem: Nutrition  Intervention: Swallowing evaluation  Note:   Education completed re: results and recommendations, role of ST for dysphagia, benefits of oral care     Problem: Nutrition  Intervention: Aspiration precautions  Note:   Education completed re: results and recommendations, role of ST for dysphagia, signs of aspiration, benefits of oral care

## 2020-02-04 NOTE — PROGRESS NOTES
Eye: No icterus. PRRR  Neck: supple  Cardiovascular:          No lower leg edema with good pulsation. Mental Status: The patient is obtunded. He will open eyes to voice and goes back to sleep. He was able to answer simple questions and follow simple direction. Poor attention and concentration  Speech is soft no aphasia  Unable to assess fund of knowledge, recent remote memory due to encephalopathy. Cranial Nerves:   II: Pupils: equal, round, reactive to light  III,IV,VI: No gaze preference. No nystagmus  V: Facial sensation : NT due to confusion  VII: Facial strength and movements: intact and symmetric  Rest of the cranial nerves cannot be examined due to encephalopathy  Musculoskeletal: The patient was able to move his upper extremity and his leg spontaneously. No apparent focal weakness or asymmetry. Tone: Normal tone. No rigidity. Reflexes: 1-2 in arms and legs  Planters: flexor bilaterally. Coordination: NT due to confusion  Sensation: NT due to confusion  Gait/Posture: NT due to confusion        Data:  LABS:   Lab Results   Component Value Date     02/03/2020    K 3.3 02/03/2020    K 4.3 02/01/2020     02/03/2020    CO2 18 02/03/2020    BUN 32 02/03/2020    CREATININE 1.3 02/03/2020    GFRAA >60 02/03/2020    GFRAA >60 04/10/2013    LABGLOM 51 02/03/2020    GLUCOSE 138 02/03/2020    PHOS 2.8 11/27/2014    MG 2.20 11/26/2014    CALCIUM 8.2 02/03/2020     Lab Results   Component Value Date    WBC 15.0 02/03/2020    RBC 3.67 02/03/2020    HGB 11.6 02/03/2020    HCT 35.3 02/03/2020    MCV 96.2 02/03/2020    RDW 14.2 02/03/2020     02/03/2020     Lab Results   Component Value Date    INR 0.96 04/17/2019    PROTIME 10.9 04/17/2019       Neuroimaging and labs were independently reviewed by me and DW his family  Reviewed notes from different physicians. Impression:  Acute encephalopathy, severe. So far no specific etiology. ?   Postconcussion, metabolic encephalopathy,

## 2020-02-04 NOTE — CONSULTS
Inpatient consult to Palliative Care  Consult performed by: Yareli Stover RN  Consult ordered by: JUSTUS Benjamin - CNP  Reason for consult: [address] Initial Note  Palliative Care Admit date:   2/4/2020    Advance Directives:  Pts children informed writer that they, Steve Cavazos and Essence Maier, are co-proxy decision makers for pt. We do not have a copy of his AD's in this EMR. Family agree to provide. Pts code status has been amended to [facility]. Plan of care/goals:   Lengthy d/w [place] @ BS; they had the benefit of BS d/w hospitalist, as well. They have rec'd medical update and are aware of options. Essence Maier was very clear to verbalize his understanding of what it means to pt to live well and feels as though pts reserves for continuing aggressive care are limited. Additionally, pts son had keen insight that pt would not want to endure LTC placement, it was very important to pt to live independently which doesn't seem likely in the future. Answered their questions re: hospice here vs the [facility]PU vs at the [facility]. Steve Cavazos continues to suffer after the loss of their mother in the hospice  IPU 2 yrs ago so she does not currently feel she can support IPU placement. They understand that pts life expectancy, if pts diminished LOC w/ no po intake persists, will be limited. Social/Spiritual:  Pt has lived @ [facility] for 7 years. Recently, they had pvt duty [employer] \"an hour at bedtime and an hour in the morning\" to assist pt w/ ADL's. He was able to ambulate limited distances w/ his walker. Family denies feeling there are specific spiritual needs, they are aware of  availability should they desire. Plan:   Will continue to follow to offer support/education as they determine [address]        Reason for consult:    _X_ Advance Care Planning  ___ Transition of Care Planning  _X_ Psychosocial/Spiritual Support  ___ Symptom Management                                                                                                                      Rigoberto Roth RN

## 2020-02-04 NOTE — PROGRESS NOTES
Urology Progress Note      Subjective:   Pt has no c/o  Malik just replaced to collect UA    Vitals:  /78   Pulse 87   Temp 98.2 °F (36.8 °C) (Oral)   Resp 20   Ht 5' 3\" (1.6 m)   Wt 140 lb (63.5 kg)   SpO2 95%   BMI 24.80 kg/m²   Temp  Av.6 °F (37 °C)  Min: 98 °F (36.7 °C)  Max: 100.3 °F (37.9 °C)    Intake/Output Summary (Last 24 hours) at 2020 1014  Last data filed at 2/3/2020 1838  Gross per 24 hour   Intake --   Output 200 ml   Net -200 ml       Exam:     Labs:  WBC:    Lab Results   Component Value Date    WBC 15.0 2020     Hemoglobin/Hematocrit:    Lab Results   Component Value Date    HGB 11.6 2020    HCT 35.3 2020     BMP:    Lab Results   Component Value Date     2020    K 3.3 2020    K 4.3 2020     2020    CO2 18 2020    BUN 32 2020    LABALBU 3.0 2020    CREATININE 1.3 2020    CALCIUM 8.2 2020    GFRAA >60 2020    GFRAA >60 04/10/2013    LABGLOM 51 2020     PT/INR:    Lab Results   Component Value Date    PROTIME 10.9 2019    INR 0.96 2019     PTT:  No results found for: APTT[APTT    Urinalysis:     Urine Culture:      Blood Culture:      Antibiotic Therapy:      Imaging:       Impression/Plan:   Continue malik  Palliative care consult pending    Manuel Brady PA-C

## 2020-02-04 NOTE — PLAN OF CARE
ADVANCED CARE PLANNING    Name:Palomo Meade       :  1924              MRN:  [id number]  Admission Date: 2020  9:12 AM  Date of Discussion:  2020       Purpose of Encounter: Advanced care planning in light of significant mental status changes   Parties in attendance: :JUSTUS Fleming CNP, Family members:Son and daughter   Decisional Capacity:pt no capacity       Objective/Medical Story: Significant mental status changes suspected cva condition deteriorating     Active Diagnoses: Active Hospital Problems    Diagnosis Date Noted    Benign prostatic hyperplasia with urinary obstruction [N40.1, N13.8] 2013     Priority: Medium    Acute encephalopathy [G93.40]     Fall [W19. XXXA]     Slurred speech [R47.81] 2020    HTN (hypertension), benign [I10] 2020    Elevated LFTs [R94.5] 2020    Leukocytosis [D72.829] 2020    CHF (congestive heart failure) [address] [I50.9] 2020    Dementia ([facility] [F03.90] 2019    Essential hypertension [I10]     Cardiac pacemaker in situ [Z95.0] 04/15/2013       These active diagnoses are of sufficient risk that focused discussion on advance care planning is indicated in order to allow the patient to thoughtfully consider personal goals of care; and if situations arise that prevent the ability to personally give input, to ensure appropriate representation of their personal desires for different levels and agressiveness of care. Goals of Care Determinations: Patient wishes to focus on plan possible hospice   Code Status: At this time patient wishes to be DNR-CC    Time Spent: 35    Total time spent face-to-face in education and discussion: 35 minutes. Electronically signed by JUSTUS Spears CNP on 2020 at 1:28 PM    Thank you Kishan Cameron MD for the opportunity to be involved in this patient's care.  If you have any questions or concerns please feel free to contact me at [phone].

## 2020-02-04 NOTE — PROGRESS NOTES
bilaterally. Full range of motion without deformity. Skin: Skin color- pale , texture, turgor normal.  No rashes or lesions. Neurologic:  Somnolent, no speech moves extremities to self   Psychiatric: defer   Capillary Refill: Brisk,< 3 seconds   Peripheral Pulses: +2 palpable, equal bilaterally       Labs:   Recent Labs     02/01/20  0943 02/02/20  0617 02/03/20  0658   WBC 13.0* 18.4* 15.0*   HGB 13.4* 12.2* 11.6*   HCT 41.3 37.5* 35.3*    125* 113*     Recent Labs     02/01/20  0943 02/03/20  0658    140   K 4.3 3.3*   CL 98* 107   CO2 23 18*   BUN 16 32*   CREATININE 0.8 1.3   CALCIUM 8.9 8.2*     Recent Labs     02/01/20  0943 02/02/20  0617   AST 58* 33   ALT 44* 35   BILIDIR  --  0.4*   BILITOT 1.3* 0.9   ALKPHOS 77 74     No results for input(s): INR in the last 72 hours. Recent Labs     02/01/20  0943 02/01/20  1934/20  0617   TROPONINI <0.01 0.02* 0.02*       Urinalysis:      Lab Results   Component Value Date    NITRU Negative 02/01/2020    WBCUA 0-2 02/01/2020    BACTERIA 3+ 04/17/2019    RBCUA 5-10 02/01/2020    BLOODU SMALL 02/01/2020    SPECGRAV 1.020 02/01/2020    GLUCOSEU Negative 02/01/2020       Radiology:  CT HEAD WO CONTRAST   Final Result   No acute intracranial abnormality. XR CHEST 1 VW   Final Result   Increasing airspace disease and pleural fluid at the left lung base, most   compatible with pneumonia and/or aspiration with a parapneumonic effusion. Vascular carotid duplex bilateral   Final Result      XR CHEST PORTABLE   Final Result   Subtle increased left lung base opacity is nonspecific and may represent   atelectasis with aspiration or pneumonia not excluded. XR HUMERUS RIGHT (MIN 2 VIEWS)   Final Result   Progressive degenerative change in the right shoulder with a joint effusion. A discrete fracture is not identified. The distal humerus is intact.          US GALLBLADDER RUQ   Final Result   Unremarkable right upper quadrant ultrasound. .  No acute abnormality         CT ABDOMEN PELVIS W IV CONTRAST Additional Contrast? None   Final Result   No acute intra-abnormality      Heterogeneous enhancing mass left kidney, likely renal cell carcinoma. Diverticulosis. No diverticulitis. CT Head WO Contrast   Final Result   No acute intracranial abnormality. XR CHEST PORTABLE   Final Result   No acute cardiopulmonary pathology. Assessment/Plan:    Active Hospital Problems    Diagnosis    Benign prostatic hyperplasia with urinary obstruction [N40.1, N13.8]     Priority: Medium    Acute encephalopathy [G93.40]    Fall [W19. XXXA]    Slurred speech [R47.81]    HTN (hypertension), benign [I10]    Elevated LFTs [R94.5]    Leukocytosis [D72.829]    CHF (congestive heart failure) (HCC) [I50.9]    Dementia (HCC) [F03.90]    Essential hypertension [I10]    Cardiac pacemaker in situ [Z95.0]     Acute encephalopathy POA with Slurred speech - progressively worsening   - CTH negative. Repeat Negative for bleed or acute findings   - telemetry, neurochecks  - unable to do MRI of the brain due to implanted pacer.  - carotid Doppler- reviewed, ECHO - reviewed   - aspirin and statin  - PT/OT and swallow eval- dysphagia   - neurology consulted. Aspiration PNA- 2/2 to dysphagia and inability to control secretions   - SLP NPO      Severe right shoulder joint osteoarthritis - pain control and consider orthopedic evaluation as an outpatient      History of prostate cancer and benign prostatic hypertrophy with urinary retention- new kidney lesion  - s/p Deutsch and urology evaluation  - f/u ultrasound in 6 months   - this is likely not contributing to overall clinical  picture      Abdominal pain - secondary urinary retention, relieved with Deutsch  - continue sanctura.     Left kidney lesion - possible renal cell ca per CT scan.   - urology consulted   - discussed with daughter at bedside.     Elevated liver function test - not quite sure about the cause, ultrasound of the gallbladder normal in the emergency room   - hepatitis profile and repeat liver function test in the morning     Dementia - on Namenda     Abnormal EKG - serial troponin 0.02. Denies CP. Possibly due to CVA. - check ECHO.     Possible aspiration pneumonia   - repeat CXR. - start IV Flagyl and Rocephin (allergy to PCN).   - follow CBC, add bld cx, pct, lactic  - SLP eval and treat - NPO for now.          DVT Prophylaxis: Lovenox   Diet: Diet NPO Effective Now  Code Status: DNR-CC    PT/OT Eval Status: ordered, unable to participate at this time       Dispo - Poor outcome   Discussed with family with family at bedside   [facility] [address] hospice or partner bed     Matthias Alonso, APRN - CNP

## 2020-02-05 NOTE — PLAN OF CARE
Palliative Care Progress Note  Palliative Care Admit date:   2/4/2020    Plan of care/goals:   Convened again w/ Baltazar Marshall and Alexandra Mcgarry, as well as, Maria Teresa's dtr (RN @ [facility]). Maria Teresa relayed their d/w Neuro yesterday and the explanation of the possibility \"that he had the kind of stroke that requires three or four days of sleeping before they wake up; I feel I need to give him that time. \"   We had exhaustive discussion re: artificial nutrition in an elder if there is not anticipated to be improvement & all understand the likelihood for recurring hospitalizations given pts advanced age and co-morbidities. We have discussed the concern around aspiration particularly in light of pts diminished mentation. Pts son and dtr concur that pt would not wish to linger, would not want LTC placement nor would he want long term artificial nutrition that wasn't expected to result in pt reaping meaningful benefit. Alexia Perez his readiness to shift the focus of care to comfort and allow pt to die naturally. Baltazar Marshall expressed the need to d/w Neuro today before making any decision. Social/Spiritual:  Family have been visited by their personal clergy @ [facility]    Plan:  Family will d/w Neuro today. Family weighing comfort care w/ hospice vs continuing goals toward improvement.           Reason for consult:    _X_ Advance Care Planning  ___ Transition of Care Planning  _X_ Psychosocial/Spiritual Support  ___ Symptom Management                                                                                                                              Asuncion Strauss RN

## 2020-02-05 NOTE — PROGRESS NOTES
kidney, likely renal cell carcinoma. Diverticulosis. No diverticulitis. CT Head WO Contrast   Final Result   No acute intracranial abnormality. XR CHEST PORTABLE   Final Result   No acute cardiopulmonary pathology. Assessment/Plan:    Active Hospital Problems    Diagnosis    Benign prostatic hyperplasia with urinary obstruction [N40.1, N13.8]     Priority: Medium    Acute encephalopathy [G93.40]    Fall [W19. XXXA]    Slurred speech [R47.81]    HTN (hypertension), benign [I10]    Elevated LFTs [R94.5]    Leukocytosis [D72.829]    CHF (congestive heart failure) (HCC) [I50.9]    Dementia (HCC) [F03.90]    Essential hypertension [I10]    Cardiac pacemaker in situ [Z95.0]     acute encephalopathy associated with postconcussion, metabolic encephalopathy,(infection related) and possible CVA. POA with Slurred speech - progressively worsening. At this point it is difficult to determine exact source. - CTH negative. Repeat Negative for bleed or acute findings   - telemetry, neurochecks  - unable to do MRI of the brain due to implanted pacer.  - carotid Doppler- reviewed, ECHO - reviewed   - aspirin and statin  - PT/OT and swallow eval- dysphagia   - neurology consulted. Sepsis: ruled out  - Suspect underlying infection, unable to ruled out meningoencephalitis 2/2 to inability to do LP as pt status would not allow.     Aspiration PNA- 2/2 to dysphagia and inability to control secretions   - SLP NPO     Possible Bacteremia   - 1/2 blood cultures positive with staph aureus      Severe right shoulder joint osteoarthritis - pain control and consider orthopedic evaluation as an outpatient      History of prostate cancer and benign prostatic hypertrophy with urinary retention- new kidney lesion  - s/p Deutsch and urology evaluation  - f/u ultrasound in 6 months   - this is likely not contributing to overall clinical  picture      Abdominal pain - secondary urinary

## 2020-02-05 NOTE — PROGRESS NOTES
Urology Progress Note      /84   Pulse 86   Temp 98.1 °F (36.7 °C) (Oral)   Resp 18   Ht 5' 3\" (1.6 m)   Wt 140 lb (63.5 kg)   SpO2 96%   BMI 24.80 kg/m²   Temp  Av.8 °F (37.1 °C)  Min: 97.9 °F (36.6 °C)  Max: 99.7 °F (37.6 °C)    Did not see pt today-going to hospice. Continue malik.   Will sign off      Ludimla Vivar PA-C

## 2020-02-05 NOTE — PROGRESS NOTES
Patient very lethargic, wakes up but then instantly falls asleep, call light within reach, bed in lowest position and locked. Skin assessment completed. Morning med pass completed. VS stable.  Pt resting, will continue to monitor

## 2020-02-06 NOTE — PROGRESS NOTES
Death checklist completed; , registration, and security contacted. [address], and [facility] also notified per directive. Post Mortem care was provided, and identification tags attached to body and bag per directive. Transport notified body ready to be transported to [address] [place] [address].

## 2020-02-06 NOTE — PROGRESS NOTES
On 2020 at 0614 patient passed away. Death verified by second RN. No respirations or lung sounds present, no pulses or heart sounds present.  home is [facility], and spiritual care was declined. Daughter and son-in-law at bedside with patient.

## 2020-02-29 NOTE — DISCHARGE SUMMARY
Hospital Medicine Discharge Summary    Patient ID: Gabrielle Smith      Patient's PCP: Eamon Strickland MD    Admitting Physician: Demetria Hobbs MD  Discharge Physician: JUSTUS Cifuentes CNP     Admit Date: 2020     Disposition:     Discharge Diagnoses: Active Hospital Problems    Diagnosis    Benign prostatic hyperplasia with urinary obstruction [N40.1, N13.8]     Priority: Medium    Acute encephalopathy [G93.40]    Fall [W19. XXXA]    Slurred speech [R47.81]    HTN (hypertension), benign [I10]    Elevated LFTs [R94.5]    Leukocytosis [D72.829]    CHF (congestive heart failure) ([facility]) [I50.9]    Dementia ([address] [F03.90]    Essential hypertension [I10]    Cardiac pacemaker in situ [Z95.0]       Date of Death:2020  Time of TQHCN:0610    Immediate Cause of Death:CardioPulmonary arrest  Underlying Conditions:see above   Other Contributing Conditions:see above     Hospital Course:89 y. o. male who presented to [facility] following a fall. Germaine Sr was also noted to be confused with slurred speech. Suspected stroke cannot have MRI   acute encephalopathy associated with postconcussion, metabolic encephalopathy,(infection related) and possible CVA. POA with Slurred speech - progressively worsening. At this point it is difficult to determine exact source. - CTH negative.  Repeat Negative for bleed or acute findings   - telemetry, neurochecks  - unable to do MRI of the brain due to implanted pacer.  - carotid Doppler- reviewed, ECHO - reviewed   - aspirin and statin  - PT/OT and swallow eval- dysphagia   - neurology consulted.     Sepsis: ruled out  - Suspect underlying infection, unable to ruled out meningoencephalitis 2/2 to inability to do LP as pt status would not allow.     Aspiration PNA- 2/2 to dysphagia and inability to control secretions   - SLP NPO      Possible Bacteremia   - 1/2 blood cultures positive with staph aureus      Severe right shoulder joint osteoarthritis - pain control and consider orthopedic evaluation as an outpatient      History of prostate cancer and benign prostatic hypertrophy with urinary retention- new kidney lesion  - s/p Deutsch and urology evaluation  - f/u ultrasound in 6 months   - this is likely not contributing to overall clinical  picture      Abdominal pain - secondary urinary retention, relieved with Duetsch  - continue sanctura.     Left kidney lesion - possible renal cell ca per CT scan. - urology consulted   - discussed with daughter at bedside.     Elevated liver function test - not quite sure about the cause, ultrasound of the gallbladder normal in the emergency room   - hepatitis profile and repeat liver function test in the morning     Dementia - on [place]     Abnormal EKG - serial troponin 0.02.  Denies CP.  Possibly due to CVA.    - check ECHO.     Possible aspiration pneumonia   - repeat CXR. - start IV Flagyl and Rocephin (allergy to PCN). - follow CBC, add bld cx, pct, lactic  - SLP eval and treat - NPO for now.     Due to pt age, condition and multiple medical issues family decided on hospice care for pt   Consults:  IP CONSULT TO STROKE TEAM  IP CONSULT TO HOSPITALIST  IP CONSULT TO NEUROLOGY  IP CONSULT TO UROLOGY  IP CONSULT TO PALLIATIVE CARE    Signed:  Disha Montenegro MD   2/29/2020    Thank you Olive Cabrales MD for the opportunity to be involved in this patient's care. If you have any questions or concerns please feel free to contact me at [phone].

## 2024-12-23 NOTE — PLAN OF CARE
Progress functional mobility Received signed and completed form from Physician's Office.  Message sent to Forms Completion Mailing Pool to mail form to patient's address on file.

## (undated) DEVICE — ALCOHOL RUBBING 16OZ 70% ISO

## (undated) DEVICE — CHLORAPREP 26ML ORANGE

## (undated) DEVICE — UNIVERSAL BLOCK TRAY: Brand: MEDLINE INDUSTRIES, INC.

## (undated) DEVICE — TOWEL OR BLUEE 16X26IN ST 8 PACK ORB08 16X26ORTWL

## (undated) DEVICE — GAUZE,SPONGE,4"X4",16PLY,STRL,LF,10/TRAY: Brand: MEDLINE

## (undated) DEVICE — STERILE POLYISOPRENE POWDER-FREE SURGICAL GLOVES: Brand: PROTEXIS